# Patient Record
Sex: FEMALE | Race: WHITE | Employment: FULL TIME | URBAN - METROPOLITAN AREA
[De-identification: names, ages, dates, MRNs, and addresses within clinical notes are randomized per-mention and may not be internally consistent; named-entity substitution may affect disease eponyms.]

---

## 2017-07-12 ENCOUNTER — OFFICE VISIT (OUTPATIENT)
Dept: URGENT CARE | Facility: URGENT CARE | Age: 27
End: 2017-07-12
Payer: COMMERCIAL

## 2017-07-12 VITALS
DIASTOLIC BLOOD PRESSURE: 58 MMHG | HEART RATE: 72 BPM | WEIGHT: 159 LBS | OXYGEN SATURATION: 100 % | BODY MASS INDEX: 23.55 KG/M2 | SYSTOLIC BLOOD PRESSURE: 92 MMHG | TEMPERATURE: 98.7 F | HEIGHT: 69 IN

## 2017-07-12 DIAGNOSIS — K64.4 EXTERNAL HEMORRHOIDS: ICD-10-CM

## 2017-07-12 DIAGNOSIS — K62.89 ANAL PAIN: Primary | ICD-10-CM

## 2017-07-12 PROCEDURE — 99202 OFFICE O/P NEW SF 15 MIN: CPT | Performed by: INTERNAL MEDICINE

## 2017-07-12 NOTE — PATIENT INSTRUCTIONS
Nifedipine /lidocaine ointment 2 x day    miralax increase to have 1 comfortable bowel movement day    See colorectal surgeon surgeon.    Call or return to clinic if symptoms worsen or fail to improve as anticipated.       Diagnosing Hemorrhoids    To diagnose hemorrhoids, your healthcare provider will rule out other problems and determine how bad your hemorrhoids are. After the evaluation, your healthcare provider will help you decide on a treatment plan that s best for you.  Medical history  A medical history helps your healthcare provider learn more about your symptoms and overall health. This often includes questions about your bowel habits and diet. You may also be asked how often you exercise and whether you take any medicines. Be sure to mention if any members of your family have had cancer or polyps of the colon.  Physical exam  During a physical exam, you ll be asked to lie on an exam table. You ll then be examined for signs of swollen hemorrhoids and other problems. The exam takes just a few minutes. It is usually not painful:    A visual exam is used to view the outer anal skin.    A digital rectal exam is used to check for hemorrhoids or other problems in the anal canal. It is done using a lubricated gloved finger.    An anoscopic exam is done using a special viewing tube called an anoscope. The scope helps your healthcare provider view the anal canal.  Grading hemorrhoids  Based on the physical exam, your [facility] provider may assign a grade to internal hemorrhoids. The grades are based on the severity of your symptoms:    Grade I hemorrhoids do not protrude from the anus. They may bleed, but otherwise cause few symptoms.    Grade II hemorrhoids protrude from the anus during bowel movements. They reduce back into the anal canal when straining stops.    Grade III hemorrhoids protrude on their own or with straining. They do not reduce by themselves, but can be pushed back into place.    Grade IV  hemorrhoids protrude and cannot be reduced at all. They can also be painful and may need prompt treatment.  Pregnancy and hemorrhoids  Many women develop hemorrhoids during pregnancy and childbirth. This is likely caused by pressure on the pelvis and by hormonal changes. In most cases, the hemorrhoids will eventually go away on their own. In the meantime, talk with your healthcare provider about ways to help relieve your symptoms.   Other anal problems  Below are common problems that can cause symptoms similar to hemorrhoids. Your healthcare provider can explain your treatment choices:    A fissure is a small tear or crack in the lining of the anus. It can be caused by hard bowel movements, diarrhea, or inflammation in the rectal area. Fissures can bleed and cause painful bowel movements.    An abscess is an infected gland in the anal canal. The infected area swells and often causes pain.    A fistula is a pathway that may form when an anal abscess drains. The pathway may remain after the abscess is gone. Fistulas are not usually painful. But they can cause drainage where the pathway meets the skin.   Date Last Reviewed: 7/1/2016 2000-2017 [employer]. [address] [place] [address]. All rights reserved. This information is not intended as a substitute for professional medical care. Always follow your healthcare professional's instructions.        Taking a Sitz Bath  A sitz bath is a type of therapy done by sitting in warm, shallow water. It can help soothe pain, itching, and other symptoms in the anal and genital areas. It can also help keep these areas clean if you can t take a bath or shower. Sitz is from the Portuguese word sitzen, which means to sit.  Why a sitz bath is done  A sitz bath helps clean and treat certain problems in the anal area, genital area, and the perineum. The perineum is the area between the anus and the vulva in women. In men, it is between the anus and the scrotum.  A sitz bath helps increase blood flow to these areas and relax the muscles.  A sitz bath may be done to:    Help ease pain and itching from hemorrhoids    Help ease pain from an anal fissure    Bathe and soothe the perineum after childbirth    Clean and soothe the anal area or perineum after surgery    Ease prostate pain during prostatitis or after a procedure    Help ease period cramps    Clean the anal and genital areas if you can t take a bath or shower  How a sitz bath is done  A sitz bath can be done in 2 ways: in a bathtub or using a sitz bath bowl.  In a bathtub  To take a sitz bath in a tub:    Make sure your bathtub is clean. Fill a clean bathtub with 3 to 4 inches of warm water. In some cases, your healthcare provider may tell you to use cold water instead.    Add salt or medicine to the water if advised by your healthcare provider.    Gently lower yourself down into the bathtub and sit on the bottom of the tub. Don t get into the bath unless the water temperature is comfortable.    Hold on to a railing. Or ask for help from a family member, friend, or caregiver if needed.  If you have a wound, the water may cause pain at first. But the pain should ease. Make sure the area that needs treating is under the water. You can bend your knees up to help expose the area that needs contact with the water.  Using a sitz bath bowl  A sitz bath bowl is a special plastic container that s placed on a toilet. You can buy this in many drugstores and medical supply stores. To take a sitz bath this way:    Lift the toilet lid and seat. Place a cleaned plastic sitz bath bowl on the rim of your toilet. Make sure the bowl is firmly in place and won t move around.    Fill the sitz bath bowl with warm water from a pitcher or other container. The water should cover your perineum. Make sure the water temperature is comfortable.    Add salt or medicine to the water if advised by your healthcare provider. Or follow the package  instructions about how to fill the bowl. Some kits come with a plastic bag and tubing. This lets you stream water into the bowl and at the area of your body that needs treatment. The bowl may have a slot or hole in the back. This lets water flow out so it doesn t overflow onto the floor. If there is no hole, be careful not to fill the bowl too full.    Gently sit down on the sitz bath bowl. Hold on to a railing. Or ask for help from a family member, friend, or caregiver if needed.  If you have a wound, the water may cause pain at first, but the pain should ease. Make sure the area that needs treating is under the water.  For any type of sitz bath:  1. Sit in the water for 10 to 20 minutes.  2. Add more warm water as needed to keep the water comfortable.  3. Get up slowly from the tub or toilet. You may feel lightheaded or dizzy. Hold on to a railing. Or ask for help from a family member, friend, or caregiver if needed.  4. Gently pat your anal area, perineum, and genitals dry with a clean towel. Don t rub the area.  5. Wash your hands. Put any ointment or cream on the area, if advised.  6. Wash the bathtub or sitz bath bowl with soap and water after each use.  7. Use a sitz bath 2 to 3 times a day, or as often as your healthcare provider advises.  When to call your healthcare provider  Call your healthcare provider right away if you have any of these:    Fever of 100.4 F (38 C) or higher, or as directed    Redness, swelling, or fluid leaking from a cut (incision) that gets worse    Pain that gets worse    Symptoms that don t get better, or get worse    New symptoms   Date Last Reviewed: 5/1/2016 2000-2017 [employer]. [address] [place] [address], [place] [address]. All rights reserved. This information is not intended as a substitute for professional medical care. Always follow your healthcare professional's instructions.

## 2017-07-12 NOTE — MR AVS SNAPSHOT
After Visit Summary   7/12/2017    Leigha Mauro    MRN: [id number]           Patient Information     Date Of Birth          1990        Visit Information        Provider Department      7/12/2017 5:05 PM Jonelle Pro MD [facility]        Today's Diagnoses     Anal pain    -  1    External hemorrhoids          Care Instructions    Nifedipine /lidocaine ointment 2 x day    miralax increase to have 1 comfortable bowel movement day    See colorectal surgeon surgeon.    Call or return to clinic if symptoms worsen or fail to improve as anticipated.       Diagnosing Hemorrhoids    To diagnose hemorrhoids, your healthcare provider will rule out other problems and determine how bad your hemorrhoids are. After the evaluation, your healthcare provider will help you decide on a treatment plan that s best for you.  Medical history  A medical history helps your healthcare provider learn more about your symptoms and overall health. This often includes questions about your bowel habits and diet. You may also be asked how often you exercise and whether you take any medicines. Be sure to mention if any members of your family have had cancer or polyps of the colon.  Physical exam  During a physical exam, you ll be asked to lie on an exam table. You ll then be examined for signs of swollen hemorrhoids and other problems. The exam takes just a few minutes. It is usually not painful:    A visual exam is used to view the outer anal skin.    A digital rectal exam is used to check for hemorrhoids or other problems in the anal canal. It is done using a lubricated gloved finger.    An anoscopic exam is done using a special viewing tube called an anoscope. The scope helps your healthcare provider view the anal canal.  Grading hemorrhoids  Based on the physical exam, your [facility] provider may assign a grade to internal hemorrhoids. The grades are based on the severity of your  symptoms:    Grade I hemorrhoids do not protrude from the anus. They may bleed, but otherwise cause few symptoms.    Grade II hemorrhoids protrude from the anus during bowel movements. They reduce back into the anal canal when straining stops.    Grade III hemorrhoids protrude on their own or with straining. They do not reduce by themselves, but can be pushed back into place.    Grade IV hemorrhoids protrude and cannot be reduced at all. They can also be painful and may need prompt treatment.  Pregnancy and hemorrhoids  Many women develop hemorrhoids during pregnancy and childbirth. This is likely caused by pressure on the pelvis and by hormonal changes. In most cases, the hemorrhoids will eventually go away on their own. In the meantime, talk with your healthcare provider about ways to help relieve your symptoms.   Other anal problems  Below are common problems that can cause symptoms similar to hemorrhoids. Your healthcare provider can explain your treatment choices:    A fissure is a small tear or crack in the lining of the anus. It can be caused by hard bowel movements, diarrhea, or inflammation in the rectal area. Fissures can bleed and cause painful bowel movements.    An abscess is an infected gland in the anal canal. The infected area swells and often causes pain.    A fistula is a pathway that may form when an anal abscess drains. The pathway may remain after the abscess is gone. Fistulas are not usually painful. But they can cause drainage where the pathway meets the skin.   Date Last Reviewed: 7/1/2016 2000-2017 [employer]. [address] [place] [address]. All rights reserved. This information is not intended as a substitute for professional medical care. Always follow your healthcare professional's instructions.        Taking a Sitz Bath  A sitz bath is a type of therapy done by sitting in warm, shallow water. It can help soothe pain, itching, and other symptoms in the anal  and genital areas. It can also help keep these areas clean if you can t take a bath or shower. Sitz is from the [place]ian word sitzen, which means to sit.  Why a sitz bath is done  A sitz bath helps clean and treat certain problems in the anal area, genital area, and the perineum. The perineum is the area between the anus and the vulva in women. In men, it is between the anus and the scrotum. A sitz bath helps increase blood flow to these areas and relax the muscles.  A sitz bath may be done to:    Help ease pain and itching from hemorrhoids    Help ease pain from an anal fissure    Bathe and soothe the perineum after childbirth    Clean and soothe the anal area or perineum after surgery    Ease prostate pain during prostatitis or after a procedure    Help ease period cramps    Clean the anal and genital areas if you can t take a bath or shower  How a sitz bath is done  A sitz bath can be done in 2 ways: in a bathtub or using a sitz bath bowl.  In a bathtub  To take a sitz bath in a tub:    Make sure your bathtub is clean. Fill a clean bathtub with 3 to 4 inches of warm water. In some cases, your healthcare provider may tell you to use cold water instead.    Add salt or medicine to the water if advised by your healthcare provider.    Gently lower yourself down into the bathtub and sit on the bottom of the tub. Don t get into the bath unless the water temperature is comfortable.    Hold on to a railing. Or ask for help from a family member, friend, or caregiver if needed.  If you have a wound, the water may cause pain at first. But the pain should ease. Make sure the area that needs treating is under the water. You can bend your knees up to help expose the area that needs contact with the water.  Using a sitz bath bowl  A sitz bath bowl is a special plastic container that s placed on a toilet. You can buy this in many drugstores and medical supply stores. To take a sitz bath this way:    Lift the toilet lid and seat.  Place a cleaned plastic sitz bath bowl on the rim of your toilet. Make sure the bowl is firmly in place and won t move around.    Fill the sitz bath bowl with warm water from a pitcher or other container. The water should cover your perineum. Make sure the water temperature is comfortable.    Add salt or medicine to the water if advised by your healthcare provider. Or follow the package instructions about how to fill the bowl. Some kits come with a plastic bag and tubing. This lets you stream water into the bowl and at the area of your body that needs treatment. The bowl may have a slot or hole in the back. This lets water flow out so it doesn t overflow onto the floor. If there is no hole, be careful not to fill the bowl too full.    Gently sit down on the sitz bath bowl. Hold on to a railing. Or ask for help from a family member, friend, or caregiver if needed.  If you have a wound, the water may cause pain at first, but the pain should ease. Make sure the area that needs treating is under the water.  For any type of sitz bath:  1. Sit in the water for 10 to 20 minutes.  2. Add more warm water as needed to keep the water comfortable.  3. Get up slowly from the tub or toilet. You may feel lightheaded or dizzy. Hold on to a railing. Or ask for help from a family member, friend, or caregiver if needed.  4. Gently pat your anal area, perineum, and genitals dry with a clean towel. Don t rub the area.  5. Wash your hands. Put any ointment or cream on the area, if advised.  6. Wash the bathtub or sitz bath bowl with soap and water after each use.  7. Use a sitz bath 2 to 3 times a day, or as often as your healthcare provider advises.  When to call your healthcare provider  Call your healthcare provider right away if you have any of these:    Fever of 100.4 F (38 C) or higher, or as directed    Redness, swelling, or fluid leaking from a cut (incision) that gets worse    Pain that gets worse    Symptoms that don t get  "better, or get worse    New symptoms   Date Last Reviewed: 2016-2017 [employer]. [address] [place] [address], [place] [address]. All rights reserved. This information is not intended as a substitute for professional medical care. Always follow your healthcare professional's instructions.                Follow-ups after your visit        Who to contact     If you have questions or need follow up information about today's clinic visit or your schedule please contact [facility] directly at [phone].  Normal or non-critical lab and imaging results will be communicated to you by [employer], letter or phone within 4 business days after the clinic has received the results. If you do not hear from us within 7 days, please contact the clinic through [employer] or phone. If you have a critical or abnormal lab result, we will notify you by phone as soon as possible.  Submit refill requests through [employer] or call your pharmacy and they will forward the refill request to us. Please allow 3 business days for your refill to be completed.          Additional Information About Your Visit        [employer] Information     [employer] lets you send messages to your doctor, view your test results, renew your prescriptions, schedule appointments and more. To sign up, go to [email or web address] . Click on \"Log in\" on the left side of the screen, which will take you to the Welcome page. Then click on \"Sign up Now\" on the right side of the page.     You will be asked to enter the access code listed below, as well as some personal information. Please follow the directions to create your username and password.     Your access code is: [email or web address]  Expires: 10/10/2017  5:25 PM     Your access code will  in 90 days. If you need help or a new code, please call your [facility] clinic or [phone].        Care EveryWhere ID     This is your Care EveryWhere ID. This could be used by other " "organizations to access your [facility] medical records  [id number]        Your Vitals Were     Pulse Temperature Height Pulse Oximetry BMI (Body Mass Index)       72 98.7  F (37.1  C) (Oral) 5' 9\" (1.753 m) 100% 23.48 kg/m2        Blood Pressure from Last 3 Encounters:   07/12/17 92/58   01/02/16 98/69   12/21/15 107/64    Weight from Last 3 Encounters:   07/12/17 159 lb (72.1 kg)   01/02/16 170 lb (77.1 kg)   08/23/12 135 lb (61.2 kg)              Today, you had the following     No orders found for display         Today's Medication Changes          These changes are accurate as of: 7/12/17  5:25 PM.  If you have any questions, ask your nurse or doctor.               Start taking these medicines.        Dose/Directions    COMPOUND - PHARMACY TO MIX COMPOUNDED MEDICATION   Commonly known as:  CMPD RX   Used for:  Anal pain, External hemorrhoids   Started by:  Jonelle Pro MD        Dose:  1 applicator   Place 1 applicator rectally 2 times daily   Quantity:  30 g   Refills:  0            Where to get your medicines      These medications were sent to [facility] - [place], [place] - [address]  [address], [place] [address]     Phone:  [phone]     COMPOUND - PHARMACY TO MIX COMPOUNDED MEDICATION                Primary Care Provider    Physician No Ref-Primary       No address on file        Equal Access to Services     CURT ANGEL AH: Hadii robles barrowo [place], waaxda luqadaha, qaybta kaalmada adeegyada, kim johnston. So [facility] [phone].    ATENCIÓN: Si habla español, tiene a meredtih disposición servicios gratuitos de asistencia lingüística. Jose Manuelame al [phone].    We comply with applicable federal civil rights laws and [place] laws. We do not discriminate on the basis of race, color, national origin, age, disability sex, sexual orientation or gender identity.            Thank you!     Thank you for choosing [facility] " [facility]ARE  for your care. Our goal is always to provide you with excellent care. Hearing back from our patients is one way we can continue to improve our services. Please take a few minutes to complete the written survey that you may receive in the mail after your visit with us. Thank you!             Your Updated Medication List - Protect others around you: Learn how to safely use, store and throw away your medicines at [email or web address].          This list is accurate as of: 7/12/17  5:25 PM.  Always use your most recent med list.                   Brand Name Dispense Instructions for use Diagnosis    COMPOUND - PHARMACY TO MIX COMPOUNDED MEDICATION    CMPD RX    30 g    Place 1 applicator rectally 2 times daily    Anal pain, External hemorrhoids       etonogestrel 68 MG Impl    IMPLANON/NEXPLANON     1 each by Subdermal route once.        * FLUoxetine 10 MG capsule    PROzac    30 capsule    Take 1 capsule by mouth daily.    Alcohol dependence (H)       * FLUoxetine 40 MG capsule    PROZAC    30 capsule    Take 1 capsule (40 mg) by mouth daily    Depressive disorder, not elsewhere classified       GABAPENTIN PO      Take 100 mg by mouth 3 times daily        ibuprofen 200 MG tablet    ADVIL/MOTRIN     Take 1-2 tablets by mouth every 6 hours as needed.        METHOTREXATE PO           traZODone 100 MG tablet    DESYREL    60 tablet    Take 2 tablets (200 mg) by mouth At Bedtime    Alcohol dependence (H)       VYVANSE PO      Take 50 mg by mouth daily        * Notice:  This list has 2 medication(s) that are the same as other medications prescribed for you. Read the directions carefully, and ask your doctor or other care provider to review them with you.

## 2017-07-12 NOTE — PROGRESS NOTES
"SUBJECTIVE:  Leigha Mauro, a 27 year old female scheduled an appointment to discuss the following issues:  Chief Complaint   Patient presents with     Urgent Care     Pt in clinic c/o painful hemorroids.     Hemorrhoids   developed hemmorhoids with pregnancy  Currently has painful bowel movement and has noted some blood with stools.      Has eating d/o.  Recently out of [place]  So has diarr/constipation alternating due to eating disorder    treatment tucks pad, prep H oint & wipes  Prep H suppposities    Takes miralax daily.  Tried metamucil    Does eat vegs / fruits      Current Outpatient Prescriptions on File Prior to Visit:  GABAPENTIN PO Take 100 mg by mouth 3 times daily   traZODone (DESYREL) 100 MG tablet Take 2 tablets (200 mg) by mouth At Bedtime   etonogestrel (IMPLANON) 68 MG IMPL 1 each by Subdermal route once.     Lisdexamfetamine Dimesylate (VYVANSE PO) Take 50 mg by mouth daily   FLUoxetine (PROZAC) 40 MG capsule Take 1 capsule (40 mg) by mouth daily (Patient not taking: Reported on 7/12/2017)   FLUoxetine (PROZAC) 10 MG capsule Take 1 capsule by mouth daily. (Patient not taking: Reported on 7/12/2017)   ibuprofen (ADVIL,MOTRIN) 200 MG tablet Take 1-2 tablets by mouth every 6 hours as needed.     No current facility-administered medications on file prior to visit.     Fhx - negative     Medical, social, surgical, and family histories reviewed and updated as of 7/12/2017.      OBJECTIVE:  BP 92/58  Pulse 72  Temp 98.7  F (37.1  C) (Oral)  Ht 5' 9\" (1.753 m)  Wt 159 lb (72.1 kg)  SpO2 100%  BMI 23.48 kg/m2  EXAM:  GENERAL APPEARANCE: healthy, alert and no distress  Rectal exam: Patient does have a large hemorrhoidal tag in first quadrant that is not thrombosed that appears slightly irritated. Exam was difficult and unable to see obvious fissure but suspicious for fissures. With rectal exam she had the greatest tenderness at 6:00.      ASSESSMENT/PLAN:    ASSESSMENT/PLAN:      ICD-10-CM    1. " Anal pain K62.89 COMPOUND (CMPD RX) - PHARMACY TO MIX COMPOUNDED MEDICATION     DISCONTINUED: COMPOUND (CMPD RX) - PHARMACY TO MIX COMPOUNDED MEDICATION    presumed anal fissure   2. External hemorrhoids K64.4 COMPOUND (CMPD RX) - PHARMACY TO MIX COMPOUNDED MEDICATION     DISCONTINUED: COMPOUND (CMPD RX) - PHARMACY TO MIX COMPOUNDED MEDICATION       Patient Instructions     Nifedipine /lidocaine ointment 2 x day    miralax increase to have 1 comfortable bowel movement day    See colorectal surgeon surgeon.    Call or return to clinic if symptoms worsen or fail to improve as anticipated.       Diagnosing Hemorrhoids    To diagnose hemorrhoids, your healthcare provider will rule out other problems and determine how bad your hemorrhoids are. After the evaluation, your healthcare provider will help you decide on a treatment plan that s best for you.  Medical history  A medical history helps your healthcare provider learn more about your symptoms and overall health. This often includes questions about your bowel habits and diet. You may also be asked how often you exercise and whether you take any medicines. Be sure to mention if any members of your family have had cancer or polyps of the colon.  Physical exam  During a physical exam, you ll be asked to lie on an exam table. You ll then be examined for signs of swollen hemorrhoids and other problems. The exam takes just a few minutes. It is usually not painful:    A visual exam is used to view the outer anal skin.    A digital rectal exam is used to check for hemorrhoids or other problems in the anal canal. It is done using a lubricated gloved finger.    An anoscopic exam is done using a special viewing tube called an anoscope. The scope helps your healthcare provider view the anal canal.  Grading hemorrhoids  Based on the physical exam, your [facility] provider may assign a grade to internal hemorrhoids. The grades are based on the severity of your symptoms:    Grade I  hemorrhoids do not protrude from the anus. They may bleed, but otherwise cause few symptoms.    Grade II hemorrhoids protrude from the anus during bowel movements. They reduce back into the anal canal when straining stops.    Grade III hemorrhoids protrude on their own or with straining. They do not reduce by themselves, but can be pushed back into place.    Grade IV hemorrhoids protrude and cannot be reduced at all. They can also be painful and may need prompt treatment.  Pregnancy and hemorrhoids  Many women develop hemorrhoids during pregnancy and childbirth. This is likely caused by pressure on the pelvis and by hormonal changes. In most cases, the hemorrhoids will eventually go away on their own. In the meantime, talk with your healthcare provider about ways to help relieve your symptoms.   Other anal problems  Below are common problems that can cause symptoms similar to hemorrhoids. Your healthcare provider can explain your treatment choices:    A fissure is a small tear or crack in the lining of the anus. It can be caused by hard bowel movements, diarrhea, or inflammation in the rectal area. Fissures can bleed and cause painful bowel movements.    An abscess is an infected gland in the anal canal. The infected area swells and often causes pain.    A fistula is a pathway that may form when an anal abscess drains. The pathway may remain after the abscess is gone. Fistulas are not usually painful. But they can cause drainage where the pathway meets the skin.   Date Last Reviewed: 7/1/2016 2000-2017 [employer]. [address] [place] [address]. All rights reserved. This information is not intended as a substitute for professional medical care. Always follow your healthcare professional's instructions.        Taking a Sitz Bath  A sitz bath is a type of therapy done by sitting in warm, shallow water. It can help soothe pain, itching, and other symptoms in the anal and genital areas. It  can also help keep these areas clean if you can t take a bath or shower. Sitz is from the Portuguese word sitzen, which means to sit.  Why a sitz bath is done  A sitz bath helps clean and treat certain problems in the anal area, genital area, and the perineum. The perineum is the area between the anus and the vulva in women. In men, it is between the anus and the scrotum. A sitz bath helps increase blood flow to these areas and relax the muscles.  A sitz bath may be done to:    Help ease pain and itching from hemorrhoids    Help ease pain from an anal fissure    Bathe and soothe the perineum after childbirth    Clean and soothe the anal area or perineum after surgery    Ease prostate pain during prostatitis or after a procedure    Help ease period cramps    Clean the anal and genital areas if you can t take a bath or shower  How a sitz bath is done  A sitz bath can be done in 2 ways: in a bathtub or using a sitz bath bowl.  In a bathtub  To take a sitz bath in a tub:    Make sure your bathtub is clean. Fill a clean bathtub with 3 to 4 inches of warm water. In some cases, your healthcare provider may tell you to use cold water instead.    Add salt or medicine to the water if advised by your healthcare provider.    Gently lower yourself down into the bathtub and sit on the bottom of the tub. Don t get into the bath unless the water temperature is comfortable.    Hold on to a railing. Or ask for help from a family member, friend, or caregiver if needed.  If you have a wound, the water may cause pain at first. But the pain should ease. Make sure the area that needs treating is under the water. You can bend your knees up to help expose the area that needs contact with the water.  Using a sitz bath bowl  A sitz bath bowl is a special plastic container that s placed on a toilet. You can buy this in many drugstores and medical supply stores. To take a sitz bath this way:    Lift the toilet lid and seat. Place a cleaned plastic  sitz bath bowl on the rim of your toilet. Make sure the bowl is firmly in place and won t move around.    Fill the sitz bath bowl with warm water from a pitcher or other container. The water should cover your perineum. Make sure the water temperature is comfortable.    Add salt or medicine to the water if advised by your healthcare provider. Or follow the package instructions about how to fill the bowl. Some kits come with a plastic bag and tubing. This lets you stream water into the bowl and at the area of your body that needs treatment. The bowl may have a slot or hole in the back. This lets water flow out so it doesn t overflow onto the floor. If there is no hole, be careful not to fill the bowl too full.    Gently sit down on the sitz bath bowl. Hold on to a railing. Or ask for help from a family member, friend, or caregiver if needed.  If you have a wound, the water may cause pain at first, but the pain should ease. Make sure the area that needs treating is under the water.  For any type of sitz bath:  1. Sit in the water for 10 to 20 minutes.  2. Add more warm water as needed to keep the water comfortable.  3. Get up slowly from the tub or toilet. You may feel lightheaded or dizzy. Hold on to a railing. Or ask for help from a family member, friend, or caregiver if needed.  4. Gently pat your anal area, perineum, and genitals dry with a clean towel. Don t rub the area.  5. Wash your hands. Put any ointment or cream on the area, if advised.  6. Wash the bathtub or sitz bath bowl with soap and water after each use.  7. Use a sitz bath 2 to 3 times a day, or as often as your healthcare provider advises.  When to call your healthcare provider  Call your healthcare provider right away if you have any of these:    Fever of 100.4 F (38 C) or higher, or as directed    Redness, swelling, or fluid leaking from a cut (incision) that gets worse    Pain that gets worse    Symptoms that don t get better, or get worse    New  symptoms   Date Last Reviewed: 5/1/2016 2000-2017 [employer]. [address] [place] [address], [place] [address]. All rights reserved. This information is not intended as a substitute for professional medical care. Always follow your healthcare professional's instructions.              Jonelle Pro MD

## 2017-07-12 NOTE — NURSING NOTE
"Chief Complaint   Patient presents with     Urgent Care     Pt in clinic c/o painful hemorroids.     Hemorrhoids       Initial BP 92/58  Pulse 72  Temp 98.7  F (37.1  C) (Oral)  Ht 5' 9\" (1.753 m)  Wt 159 lb (72.1 kg)  SpO2 100%  BMI 23.48 kg/m2 Estimated body mass index is 23.48 kg/(m^2) as calculated from the following:    Height as of this encounter: 5' 9\" (1.753 m).    Weight as of this encounter: 159 lb (72.1 kg).  Medication Reconciliation: complete   Leandra Badillo/ MA    "

## 2017-07-13 ENCOUNTER — TELEPHONE (OUTPATIENT)
Dept: GENERAL RADIOLOGY | Facility: CLINIC | Age: 27
End: 2017-07-13

## 2017-07-13 DIAGNOSIS — K64.4 EXTERNAL HEMORRHOIDS: ICD-10-CM

## 2017-07-13 DIAGNOSIS — K62.89 ANAL PAIN: Primary | ICD-10-CM

## 2017-07-13 NOTE — TELEPHONE ENCOUNTER
We received an Rx for a compounded medication, but it does not specify what medication.  Please send new Rx.  Thanks.

## 2017-07-13 NOTE — TELEPHONE ENCOUNTER
"Writer reviewed 7/12/17 office visit note:  \"Nifedipine /lidocaine ointment 2 x day\"      Med pended with note to pharmacy for compounding.    Dr. Pro/Covering providers-Please review and sign if agree.    Thank you!  HETAL Gonzalez, BSN, RN    "

## 2018-07-29 ENCOUNTER — APPOINTMENT (OUTPATIENT)
Dept: GENERAL RADIOLOGY | Facility: CLINIC | Age: 28
End: 2018-07-29
Attending: EMERGENCY MEDICINE
Payer: COMMERCIAL

## 2018-07-29 ENCOUNTER — HOSPITAL ENCOUNTER (EMERGENCY)
Facility: CLINIC | Age: 28
Discharge: HOME | End: 2018-07-29
Attending: EMERGENCY MEDICINE | Admitting: EMERGENCY MEDICINE
Payer: COMMERCIAL

## 2018-07-29 VITALS
SYSTOLIC BLOOD PRESSURE: 93 MMHG | TEMPERATURE: 98.2 F | OXYGEN SATURATION: 100 % | DIASTOLIC BLOOD PRESSURE: 67 MMHG | BODY MASS INDEX: 18.94 KG/M2 | HEIGHT: 68 IN | RESPIRATION RATE: 18 BRPM | WEIGHT: 125 LBS

## 2018-07-29 DIAGNOSIS — R55 SYNCOPE, UNSPECIFIED SYNCOPE TYPE: ICD-10-CM

## 2018-07-29 LAB
ALBUMIN UR-MCNC: NEGATIVE MG/DL
ANION GAP SERPL CALCULATED.3IONS-SCNC: 7 MMOL/L (ref 3–14)
APPEARANCE UR: CLEAR
BACTERIA #/AREA URNS HPF: ABNORMAL /HPF
BASOPHILS # BLD AUTO: 0.1 10E9/L (ref 0–0.2)
BASOPHILS NFR BLD AUTO: 0.7 %
BILIRUB UR QL STRIP: NEGATIVE
BUN SERPL-MCNC: 9 MG/DL (ref 7–30)
CALCIUM SERPL-MCNC: 8.8 MG/DL (ref 8.5–10.1)
CHLORIDE SERPL-SCNC: 106 MMOL/L (ref 94–109)
CO2 SERPL-SCNC: 28 MMOL/L (ref 20–32)
COLOR UR AUTO: ABNORMAL
CREAT SERPL-MCNC: 0.82 MG/DL (ref 0.52–1.04)
DIFFERENTIAL METHOD BLD: ABNORMAL
EOSINOPHIL # BLD AUTO: 0.1 10E9/L (ref 0–0.7)
EOSINOPHIL NFR BLD AUTO: 1.9 %
ERYTHROCYTE [DISTWIDTH] IN BLOOD BY AUTOMATED COUNT: 14.9 % (ref 10–15)
GFR SERPL CREATININE-BSD FRML MDRD: 83 ML/MIN/1.7M2
GLUCOSE SERPL-MCNC: 89 MG/DL (ref 70–99)
GLUCOSE UR STRIP-MCNC: NEGATIVE MG/DL
HCG UR QL: NEGATIVE
HCT VFR BLD AUTO: 34.7 % (ref 35–47)
HGB BLD-MCNC: 11.4 G/DL (ref 11.7–15.7)
HGB UR QL STRIP: NEGATIVE
IMM GRANULOCYTES # BLD: 0 10E9/L (ref 0–0.4)
IMM GRANULOCYTES NFR BLD: 0.3 %
INTERPRETATION ECG - MUSE: NORMAL
KETONES UR STRIP-MCNC: NEGATIVE MG/DL
LEUKOCYTE ESTERASE UR QL STRIP: NEGATIVE
LYMPHOCYTES # BLD AUTO: 2.6 10E9/L (ref 0.8–5.3)
LYMPHOCYTES NFR BLD AUTO: 37.9 %
MCH RBC QN AUTO: 27.3 PG (ref 26.5–33)
MCHC RBC AUTO-ENTMCNC: 32.9 G/DL (ref 31.5–36.5)
MCV RBC AUTO: 83 FL (ref 78–100)
MONOCYTES # BLD AUTO: 0.6 10E9/L (ref 0–1.3)
MONOCYTES NFR BLD AUTO: 8.7 %
MUCOUS THREADS #/AREA URNS LPF: PRESENT /LPF
NEUTROPHILS # BLD AUTO: 3.4 10E9/L (ref 1.6–8.3)
NEUTROPHILS NFR BLD AUTO: 50.5 %
NITRATE UR QL: NEGATIVE
NRBC # BLD AUTO: 0 10*3/UL
NRBC BLD AUTO-RTO: 0 /100
PH UR STRIP: 7.5 PH (ref 5–7)
PLATELET # BLD AUTO: 312 10E9/L (ref 150–450)
POTASSIUM SERPL-SCNC: 3.2 MMOL/L (ref 3.4–5.3)
RBC # BLD AUTO: 4.17 10E12/L (ref 3.8–5.2)
RBC #/AREA URNS AUTO: <1 /HPF (ref 0–2)
SODIUM SERPL-SCNC: 141 MMOL/L (ref 133–144)
SOURCE: ABNORMAL
SP GR UR STRIP: 1 (ref 1–1.03)
SQUAMOUS #/AREA URNS AUTO: 1 /HPF (ref 0–1)
UROBILINOGEN UR STRIP-MCNC: NORMAL MG/DL (ref 0–2)
WBC # BLD AUTO: 6.8 10E9/L (ref 4–11)
WBC #/AREA URNS AUTO: 0 /HPF (ref 0–5)

## 2018-07-29 PROCEDURE — 80048 BASIC METABOLIC PNL TOTAL CA: CPT | Performed by: EMERGENCY MEDICINE

## 2018-07-29 PROCEDURE — 25000128 H RX IP 250 OP 636: Performed by: EMERGENCY MEDICINE

## 2018-07-29 PROCEDURE — 99285 EMERGENCY DEPT VISIT HI MDM: CPT | Mod: 25

## 2018-07-29 PROCEDURE — 81025 URINE PREGNANCY TEST: CPT | Performed by: EMERGENCY MEDICINE

## 2018-07-29 PROCEDURE — 85025 COMPLETE CBC W/AUTO DIFF WBC: CPT | Performed by: EMERGENCY MEDICINE

## 2018-07-29 PROCEDURE — 93005 ELECTROCARDIOGRAM TRACING: CPT

## 2018-07-29 PROCEDURE — 96360 HYDRATION IV INFUSION INIT: CPT

## 2018-07-29 PROCEDURE — 81001 URINALYSIS AUTO W/SCOPE: CPT | Performed by: EMERGENCY MEDICINE

## 2018-07-29 PROCEDURE — 71046 X-RAY EXAM CHEST 2 VIEWS: CPT

## 2018-07-29 RX ADMIN — SODIUM CHLORIDE 1000 ML: 9 INJECTION, SOLUTION INTRAVENOUS at 17:00

## 2018-07-29 ASSESSMENT — ENCOUNTER SYMPTOMS
DYSURIA: 0
ABDOMINAL PAIN: 0
DIZZINESS: 1
LIGHT-HEADEDNESS: 1

## 2018-07-29 NOTE — DISCHARGE INSTRUCTIONS
Causes of Syncope  Syncope (fainting) has many causes. Sometimes it is not serious. In other cases, syncope is a sign of a heart problem. But treatment can help    When syncope is not serious  Your healthcare provider may call your problem vasovagal syncope, reflex syncope, or orthostatic hypotension. These types of syncope are generally not serious. They can be caused by:    Strong feelings, such as anxiety or fear. A nerve signal may briefly change your heart rate and lower your blood pressure too much.    Standing for too long. Standing may cause blood to pool in your legs. When this happens, your brain may not receive all the blood it needs.    Standing up too quickly. Your blood pressure may not adjust fast enough to changes in posture and may drop too low. Certain medicines can also cause this problem. Examples of medicines that can cause a drop in blood pressure include diuretics, blood pressure medicines, and medicines for chest pain. Your pharmacist or healthcare provider can discuss these with you.    Reaction to normal body functions. When you go to the bathroom, have gastrointestinal discomfort, nausea, or pain, your heart may have a natural reflex to slow down and lower blood pressure. This can result in syncope. This may also follow exercise, eating, laughter, weight lifting, or playing musical instruments like the trumpet or trombone.  When heart trouble causes syncope  A heart problem can decrease the amount of oxygen-rich blood that reaches the brain. Heart trouble can be serious and even life threatening if not treated:    A slow heart rate. Electrical signals tell the chambers of the heart when to pump. But the signals may be slowed or blocked (heart block) as they travel on the heart s electrical pathways. This can be caused by aging, scarred heart tissue, or damage from heart disease. When the heart rate slows, not enough blood is pumped.    A fast heart rate. Certain problems can make the  heart race. For instance, after a heart attack, also known as acute myocardial infarction, or AMI, abnormal electrical signals may be created. These signals can make the heart suddenly beat very fast. The heart pumps before the chambers can fill with blood. So less blood reaches the brain and other parts of the body. Illegal drugs, certain medicines, heart disease, or an inherited condition can also cause this.    A heart valve problem. Blood travels through the chambers of the heart as it is pumped. Heart valves open and close to help move blood in the right direction. But a valve may not open or close fully, if it s hardened or scarred. As a result, less blood is pumped through the heart to the brain and body. Most often, syncope occurs when a person's aortic valve is critically narrowed and he or she participates in  a strenuous activity.    A heart muscle problem. Some people develop a thickened heart muscle that blocks blood flow out of the heart to the body. This is called hypertrophic cardiomyopathy. Being dehydrated and having hypertrophic cardiomyopathy can increase the risk for syncope.  Whatever the cause of syncope, it is important to be evaluated by your healthcare provider. You may need to be seen by a cardiologist, neurologist, or an ear, nose, and throat specialist. Do not drive, operate heavy machinery, or participate in activities in which you would be at risk for falls and injury if you have syncope and have not been evaluated.  Date Last Reviewed: 5/1/2016 2000-2017 [employer]. [address] [place] [address]. All rights reserved. This information is not intended as a substitute for professional medical care. Always follow your healthcare professional's instructions.      Discharge Instructions  Syncope    Syncope (fainting) is a sudden, short loss of consciousness (passing out spell). People will usually fall to the ground when they faint or slump over if seated.  At  this time your doctor does not find that your fainting spell is a sign of anything dangerous or life-threatening.  However, sometimes the signs of serious illness do not show up right away.  If you have new or worse symptoms, you may need to be seen again in the Emergency Department or by your primary doctor. Be sure to follow up as directed, or at least within 1 week.      Return to the Emergency Department if:    You faint again.     You have any significant bleeding.    You have chest pain or fast heartbeat, or if your heartbeat is irregular or skipping beats.    You feel short of breath.    You cough up any blood.    You have belly (abdominal) pain or unusual back pain.    You have ongoing vomiting (throwing up) or diarrhea, or have a black or tarry bowel movement or blood in the bowels or blood in your vomit.    You have a fever over 101 degrees.    You lose feeling or cannot move a part of your body or cannot talk normally.    You are confused, have a headache, cannot see well, or have a seizure.    DO NOT DRIVE. CALL 911 INSTEAD!    What can I do to help myself?    Follow any specific instructions that your provider discussed with you.    If you feel light-headed, make sure to sit down right away, even if you have to sit on the floor.    Follow up with your regular medical doctor as discussed for further management. This may include lowering your blood pressure medications, insulin or other diabetic medications, checking your blood sugar more frequently, and drinking more fluids, taking medicines for vomiting or diarrhea or getting up slower.  If you were given a prescription for medicine here today, be sure to read all of the information (including the package insert) that comes with your prescription.  This will include important information about the medicine, its side effects, and any warnings that you need to know about.  The pharmacist who fills the prescription can provide more information and answer  questions you may have about the medicine.  If you have questions or concerns that the pharmacist cannot address, please call or return to the Emergency Department.     Opioid Medication Information    Pain medications are among the most commonly prescribed medicines, so we are including this information for all our patients. If you did not receive pain medication or get a prescription for pain medicine, you can ignore it.     You may have been given a prescription for an opioid (narcotic) pain medicine and/or have received a pain medicine while here in the Emergency Department. These medicines can make you drowsy or impaired. You must not drive, operate dangerous equipment, or engage in any other dangerous activities while taking these medications. If you drive while taking these medications, you could be arrested for DUI, or driving under the influence. Do not drink any alcohol while you are taking these medications.     Opioid pain medications can cause addiction. If you have a history of chemical dependency of any type, you are at a higher risk of becoming addicted to pain medications.  Only take these prescribed medications to treat your pain when all other options have been tried. Take it for as short a time and as few doses as possible. Store your pain pills in a secure place, as they are frequently stolen and provide a dangerous opportunity for children or visitors in your house to start abusing these powerful medications. We will not replace any lost or stolen medicine.  As soon as your pain is better, you should flush all your remaining medication.     Many prescription pain medications contain Tylenol  (acetaminophen), including Vicodin , Tylenol #3 , Norco , Lortab , and Percocet .  You should not take any extra pills of Tylenol  if you are using these prescription medications or you can get very sick.  Do not ever take more than 3000 mg of acetaminophen in any 24 hour period.    All opioids tend to  cause constipation. Drink plenty of water and eat foods that have a lot of fiber, such as fruits, vegetables, prune juice, apple juice and high fiber cereal.  Take a laxative if you don t move your bowels at least every other day. Miralax , Milk of Magnesia, Colace , or Senna  can be used to keep you regular.      Remember that you can always come back to the Emergency Department if you are not able to see your regular doctor in the amount of time listed above, if you get any new symptoms, or if there is anything that worries you.

## 2018-07-29 NOTE — ED AVS SNAPSHOT
Emergency Department    [address] [facility] [address]    Phone:  [phone]    Fax:  [phone]                                       Leigha Mauro   MRN: [id number]    Department:   Emergency Department   Date of Visit:  7/29/2018           Patient Information     Date Of Birth          1990        Your diagnoses for this visit were:     Syncope, unspecified syncope type        You were seen by Zeeshan Jimenez MD.      Follow-up Information     Follow up with Clinic, [facility]. Schedule an appointment as soon as possible for a visit in 3 days.    Why:  As needed, For repeat evaluation and symptom check    Contact information:    [address]  [facility] [id number] [phone]          Follow up with  Emergency Department.    Specialty:  EMERGENCY MEDICINE    Why:  If symptoms worsen    Contact information:    [address] [facility] [id number] [phone]        Discharge Instructions         Causes of Syncope  Syncope (fainting) has many causes. Sometimes it is not serious. In other cases, syncope is a sign of a heart problem. But treatment can help    When syncope is not serious  Your healthcare provider may call your problem vasovagal syncope, reflex syncope, or orthostatic hypotension. These types of syncope are generally not serious. They can be caused by:    Strong feelings, such as anxiety or fear. A nerve signal may briefly change your heart rate and lower your blood pressure too much.    Standing for too long. Standing may cause blood to pool in your legs. When this happens, your brain may not receive all the blood it needs.    Standing up too quickly. Your blood pressure may not adjust fast enough to changes in posture and may drop too low. Certain medicines can also cause this problem. Examples of medicines that can cause a drop in blood pressure include diuretics, blood pressure medicines,  and medicines for chest pain. Your pharmacist or healthcare provider can discuss these with you.    Reaction to normal body functions. When you go to the bathroom, have gastrointestinal discomfort, nausea, or pain, your heart may have a natural reflex to slow down and lower blood pressure. This can result in syncope. This may also follow exercise, eating, laughter, weight lifting, or playing musical instruments like the trumpet or trombone.  When heart trouble causes syncope  A heart problem can decrease the amount of oxygen-rich blood that reaches the brain. Heart trouble can be serious and even life threatening if not treated:    A slow heart rate. Electrical signals tell the chambers of the heart when to pump. But the signals may be slowed or blocked (heart block) as they travel on the heart s electrical pathways. This can be caused by aging, scarred heart tissue, or damage from heart disease. When the heart rate slows, not enough blood is pumped.    A fast heart rate. Certain problems can make the heart race. For instance, after a heart attack, also known as acute myocardial infarction, or AMI, abnormal electrical signals may be created. These signals can make the heart suddenly beat very fast. The heart pumps before the chambers can fill with blood. So less blood reaches the brain and other parts of the body. Illegal drugs, certain medicines, heart disease, or an inherited condition can also cause this.    A heart valve problem. Blood travels through the chambers of the heart as it is pumped. Heart valves open and close to help move blood in the right direction. But a valve may not open or close fully, if it s hardened or scarred. As a result, less blood is pumped through the heart to the brain and body. Most often, syncope occurs when a person's aortic valve is critically narrowed and he or she participates in  a strenuous activity.    A heart muscle problem. Some people develop a thickened heart muscle that  blocks blood flow out of the heart to the body. This is called hypertrophic cardiomyopathy. Being dehydrated and having hypertrophic cardiomyopathy can increase the risk for syncope.  Whatever the cause of syncope, it is important to be evaluated by your healthcare provider. You may need to be seen by a cardiologist, neurologist, or an ear, nose, and throat specialist. Do not drive, operate heavy machinery, or participate in activities in which you would be at risk for falls and injury if you have syncope and have not been evaluated.  Date Last Reviewed: 5/1/2016 2000-2017 [employer]. [address] [place] [id number]. All rights reserved. This information is not intended as a substitute for professional medical care. Always follow your healthcare professional's instructions.      Discharge Instructions  Syncope    Syncope (fainting) is a sudden, short loss of consciousness (passing out spell). People will usually fall to the ground when they faint or slump over if seated.  At this time your doctor does not find that your fainting spell is a sign of anything dangerous or life-threatening.  However, sometimes the signs of serious illness do not show up right away.  If you have new or worse symptoms, you may need to be seen again in the Emergency Department or by your primary doctor. Be sure to follow up as directed, or at least within 1 week.      Return to the Emergency Department if:    You faint again.     You have any significant bleeding.    You have chest pain or fast heartbeat, or if your heartbeat is irregular or skipping beats.    You feel short of breath.    You cough up any blood.    You have belly (abdominal) pain or unusual back pain.    You have ongoing vomiting (throwing up) or diarrhea, or have a black or tarry bowel movement or blood in the bowels or blood in your vomit.    You have a fever over 101 degrees.    You lose feeling or cannot move a part of your body or cannot  talk normally.    You are confused, have a headache, cannot see well, or have a seizure.    DO NOT DRIVE. CALL 911 INSTEAD!    What can I do to help myself?    Follow any specific instructions that your provider discussed with you.    If you feel light-headed, make sure to sit down right away, even if you have to sit on the floor.    Follow up with your regular medical doctor as discussed for further management. This may include lowering your blood pressure medications, insulin or other diabetic medications, checking your blood sugar more frequently, and drinking more fluids, taking medicines for vomiting or diarrhea or getting up slower.  If you were given a prescription for medicine here today, be sure to read all of the information (including the package insert) that comes with your prescription.  This will include important information about the medicine, its side effects, and any warnings that you need to know about.  The pharmacist who fills the prescription can provide more information and answer questions you may have about the medicine.  If you have questions or concerns that the pharmacist cannot address, please call or return to the Emergency Department.     Opioid Medication Information    Pain medications are among the most commonly prescribed medicines, so we are including this information for all our patients. If you did not receive pain medication or get a prescription for pain medicine, you can ignore it.     You may have been given a prescription for an opioid (narcotic) pain medicine and/or have received a pain medicine while here in the Emergency Department. These medicines can make you drowsy or impaired. You must not drive, operate dangerous equipment, or engage in any other dangerous activities while taking these medications. If you drive while taking these medications, you could be arrested for DUI, or driving under the influence. Do not drink any alcohol while you are taking these  medications.     Opioid pain medications can cause addiction. If you have a history of chemical dependency of any type, you are at a higher risk of becoming addicted to pain medications.  Only take these prescribed medications to treat your pain when all other options have been tried. Take it for as short a time and as few doses as possible. Store your pain pills in a secure place, as they are frequently stolen and provide a dangerous opportunity for children or visitors in your house to start abusing these powerful medications. We will not replace any lost or stolen medicine.  As soon as your pain is better, you should flush all your remaining medication.     Many prescription pain medications contain Tylenol  (acetaminophen), including Vicodin , Tylenol #3 , Norco , Lortab , and Percocet .  You should not take any extra pills of Tylenol  if you are using these prescription medications or you can get very sick.  Do not ever take more than 3000 mg of acetaminophen in any 24 hour period.    All opioids tend to cause constipation. Drink plenty of water and eat foods that have a lot of fiber, such as fruits, vegetables, prune juice, apple juice and high fiber cereal.  Take a laxative if you don t move your bowels at least every other day. Miralax , Milk of Magnesia, Colace , or Senna  can be used to keep you regular.      Remember that you can always come back to the Emergency Department if you are not able to see your regular doctor in the amount of time listed above, if you get any new symptoms, or if there is anything that worries you.        24 Hour Appointment Hotline       To make an appointment at any [facility], call [phone] ([phone]). If you don't have a family doctor or clinic, we will help you find one. [place] clinics are conveniently located to serve the needs of you and your family.             Review of your medicines      Our records show that you are taking the medicines listed  below. If these are incorrect, please call your family doctor or clinic.        Dose / Directions Last dose taken    * COMPOUNDED NON-CONTROLLED SUBSTANCE - PHARMACY TO MIX COMPOUNDED MEDICATION   Commonly known as:  CMPD RX   Dose:  1 applicator   Quantity:  30 g        Place 1 applicator rectally 2 times daily   Refills:  0        * COMPOUNDED NON-CONTROLLED SUBSTANCE - PHARMACY TO MIX COMPOUNDED MEDICATION   Commonly known as:  CMPD RX   Dose:  1 applicator   Quantity:  30 g        Place 1 applicator rectally 2 times daily   Refills:  0        etonogestrel 68 MG Impl   Commonly known as:  IMPLANON/NEXPLANON   Dose:  1 each        1 each by Subdermal route once.   Refills:  0        * FLUoxetine 10 MG capsule   Commonly known as:  PROzac   Dose:  10 mg   Quantity:  30 capsule        Take 1 capsule by mouth daily.   Refills:  1        * FLUoxetine 40 MG capsule   Commonly known as:  PROZAC   Dose:  40 mg   Quantity:  30 capsule        Take 1 capsule (40 mg) by mouth daily   Refills:  0        GABAPENTIN PO   Dose:  100 mg        Take 100 mg by mouth 3 times daily   Refills:  0        ibuprofen 200 MG tablet   Commonly known as:  ADVIL/MOTRIN   Dose:  1-2 tablet        Take 1-2 tablets by mouth every 6 hours as needed.   Refills:  0        METHOTREXATE PO        Refills:  0        traZODone 100 MG tablet   Commonly known as:  DESYREL   Dose:  200 mg   Quantity:  60 tablet        Take 2 tablets (200 mg) by mouth At Bedtime   Refills:  0        VYVANSE PO   Dose:  50 mg        Take 50 mg by mouth daily   Refills:  0        * Notice:  This list has 4 medication(s) that are the same as other medications prescribed for you. Read the directions carefully, and ask your doctor or other care provider to review them with you.            Procedures and tests performed during your visit     Basic metabolic panel    CBC with platelets differential    EKG 12 lead    HCG qualitative urine    UA with Microscopic    XR Chest 2 Views       Orders Needing Specimen Collection     None      Pending Results     No orders found from 7/27/2018 to 7/30/2018.            Pending Culture Results     No orders found from 7/27/2018 to 7/30/2018.            Pending Results Instructions     If you had any lab results that were not finalized at the time of your Discharge, you can call the ED Lab Result RN at [phone]. You will be contacted by this team for any positive Lab results or changes in treatment. The nurses are available 7 days a week from 10A to 6:30P.  You can leave a message 24 hours per day and they will return your call.        Test Results From Your Hospital Stay        7/29/2018  4:55 PM      Component Results     Component Value Ref Range & Units Status    WBC 6.8 4.0 - 11.0 10e9/L Final    RBC Count 4.17 3.8 - 5.2 10e12/L Final    Hemoglobin 11.4 (L) 11.7 - 15.7 g/dL Final    Hematocrit 34.7 (L) 35.0 - 47.0 % Final    MCV 83 78 - 100 fl Final    MCH 27.3 26.5 - 33.0 pg Final    MCHC 32.9 31.5 - 36.5 g/dL Final    RDW 14.9 10.0 - 15.0 % Final    Platelet Count 312 150 - 450 10e9/L Final    Diff Method Automated Method  Final    % Neutrophils 50.5 % Final    % Lymphocytes 37.9 % Final    % Monocytes 8.7 % Final    % Eosinophils 1.9 % Final    % Basophils 0.7 % Final    % Immature Granulocytes 0.3 % Final    Nucleated RBCs 0 0 /100 Final    Absolute Neutrophil 3.4 1.6 - 8.3 10e9/L Final    Absolute Lymphocytes 2.6 0.8 - 5.3 10e9/L Final    Absolute Monocytes 0.6 0.0 - 1.3 10e9/L Final    Absolute Eosinophils 0.1 0.0 - 0.7 10e9/L Final    Absolute Basophils 0.1 0.0 - 0.2 10e9/L Final    Abs Immature Granulocytes 0.0 0 - 0.4 10e9/L Final    Absolute Nucleated RBC 0.0  Final         7/29/2018  5:08 PM      Component Results     Component Value Ref Range & Units Status    Sodium 141 133 - 144 mmol/L Final    Potassium 3.2 (L) 3.4 - 5.3 mmol/L Final    Chloride 106 94 - 109 mmol/L Final    Carbon Dioxide 28 20 - 32 mmol/L Final    Anion Gap 7 3 -  14 mmol/L Final    Glucose 89 70 - 99 mg/dL Final    Urea Nitrogen 9 7 - 30 mg/dL Final    Creatinine 0.82 0.52 - 1.04 mg/dL Final    GFR Estimate 83 >60 mL/min/1.7m2 Final    Non  GFR Calc    GFR Estimate If Black >90 >60 mL/min/1.7m2 Final    African American GFR Calc    Calcium 8.8 8.5 - 10.1 mg/dL Final         7/29/2018  5:01 PM      Component Results     Component Value Ref Range & Units Status    HCG Qual Urine Negative NEG^Negative Final    This test is for screening purposes.  Results should be interpreted along with   the clinical picture.  Confirmation testing is available if warranted by   ordering [id number], HCG Quantitative Pregnancy.           7/29/2018  4:58 PM      Component Results     Component Value Ref Range & Units Status    Color Urine Straw  Final    Appearance Urine Clear  Final    Glucose Urine Negative NEG^Negative mg/dL Final    Bilirubin Urine Negative NEG^Negative Final    Ketones Urine Negative NEG^Negative mg/dL Final    Specific Gravity Urine 1.002 (L) 1.003 - 1.035 Final    Blood Urine Negative NEG^Negative Final    pH Urine 7.5 (H) 5.0 - 7.0 pH Final    Protein Albumin Urine Negative NEG^Negative mg/dL Final    Urobilinogen mg/dL Normal 0.0 - 2.0 mg/dL Final    Nitrite Urine Negative NEG^Negative Final    Leukocyte Esterase Urine Negative NEG^Negative Final    Source Midstream Urine  Final    WBC Urine 0 0 - 5 /HPF Final    RBC Urine <1 0 - 2 /HPF Final    Bacteria Urine Few (A) NEG^Negative /HPF Final    Squamous Epithelial /HPF Urine 1 0 - 1 /HPF Final    Mucous Urine Present (A) NEG^Negative /LPF Final         7/29/2018  5:59 PM      Narrative     XR CHEST 2 VW   7/29/2018 5:44 PM     HISTORY: chest pain, near syncope;     COMPARISON: None.    FINDINGS: The heart is negative. There is a calcified granuloma  projected over the right 6 rib.  The lungs are clear. The pulmonary  vasculature is normal.  The bones and soft tissues are unremarkable.        Impression      IMPRESSION: No active infiltrate. No change.        JACLYN SANDY MD                Clinical Quality Measure: Blood Pressure Screening     Your blood pressure was checked while you were in the emergency department today. The last reading we obtained was  BP: 98/71 . Please read the guidelines below about what these numbers mean and what you should do about them.  If your systolic blood pressure (the top number) is less than 120 and your diastolic blood pressure (the bottom number) is less than 80, then your blood pressure is normal. There is nothing more that you need to do about it.  If your systolic blood pressure (the top number) is 120-139 or your diastolic blood pressure (the bottom number) is 80-89, your blood pressure may be higher than it should be. You should have your blood pressure rechecked within a year by a primary care provider.  If your systolic blood pressure (the top number) is 140 or greater or your diastolic blood pressure (the bottom number) is 90 or greater, you may have high blood pressure. High blood pressure is treatable, but if left untreated over time it can put you at risk for heart attack, stroke, or kidney failure. You should have your blood pressure rechecked by a primary care provider within the next 4 weeks.  If your provider in the emergency department today gave you specific instructions to follow-up with your doctor or provider even sooner than that, you should follow that instruction and not wait for up to 4 weeks for your follow-up visit.        Thank you for choosing [facility]       Thank you for choosing [facility] for your care. Our goal is always to provide you with excellent care. Hearing back from our patients is one way we can continue to improve our services. Please take a few minutes to complete the written survey that you may receive in the mail after you visit with us. Thank you!        [employer] Information     [employer] lets you send messages to your doctor, view your test  "results, renew your prescriptions, schedule appointments and more. To sign up, go to [email or web address] . Click on \"Log in\" on the left side of the screen, which will take you to the Welcome page. Then click on \"Sign up Now\" on the right side of the page.     You will be asked to enter the access code listed below, as well as some personal information. Please follow the directions to create your username and password.     Your access code is: [email or web address]  Expires: 10/27/2018  6:12 PM     Your access code will  in 90 days. If you need help or a new code, please call your [facility] clinic or [phone].        Care EveryWhere ID     This is your Care EveryWhere ID. This could be used by other organizations to access your [facility] medical records  [id number]        Equal Access to Services     CURT ANGEL : Esperanza Ndiaye, earnest bentley, anisa mcallisteralnoe [place], kim howard . So [facility] [phone].    ATENCIÓN: Si habla español, tiene a meredith disposición servicios gratuitos de asistencia lingüística. Llame al [phone].    We comply with applicable federal civil rights laws and [place] laws. We do not discriminate on the basis of race, color, national origin, age, disability, sex, sexual orientation, or gender identity.            After Visit Summary       This is your record. Keep this with you and show to your community pharmacist(s) and doctor(s) at your next visit.                  "

## 2018-07-29 NOTE — ED PROVIDER NOTES
"  History     Chief Complaint:    Dizziness and Chest Pain     HPI   Leigha Mauro is a 28 year old female with a eating disorder, depression, and a recovering alcoholic who presents to the ED for evaluation chest pain and dizziness. The patient reports that she has been having symptoms of intermittent dizziness for the past few weeks in addition to chest pain for the past week. Today, she states that she was dizzy and light headed while she was shopping at a [employer]. She did not actually faint or lose consciousness at any point but had to sit down multiple times during the trip. She states that she has been in contact with her dietician regarding her symptoms of intermittent dizziness. She notes that she has been eating dinner for the past few days, however, that appears to be the extent of her food intake recently. She states that she has not eaten anything today. She denies any symptoms of dysuria or abdominal pain. Of note, the patient states that she is not currently having periods secondary to a birth control implant.     Allergies:  Augmentin  Cephalosporins  Penicillins  Sulfa drugs     Medications:    Implanon  Prozac  Gabapentin  Vyvanse  Methotrexate  Trazadone     Past Medical History:    Acne  Alcohol related seizure  Depressive disorder  Eating disorder    Past Surgical History:    The patient does not have any pertinent past surgical history.    Family History:    No past pertinent family history.    Social History:  Current every day smoker.   Sober for a year and a half. History of alcohol dependence.  Marital Status:  Single [1]     Review of Systems   Gastrointestinal: Negative for abdominal pain.   Genitourinary: Negative for dysuria.   Neurological: Positive for dizziness and light-headedness. Negative for syncope.   All other systems reviewed and are negative.        Physical Exam   First Vitals:  BP: 92/66  Heart Rate: 80  Temp: 98.2  F (36.8  C)  Resp: 14  Height: 172.7 cm (5' 8\")  Weight: " 56.7 kg (125 lb)  SpO2: 96 %      Physical Exam  Vitals: reviewed by me  General: [place] seen [place], pleasant, cooperative, and alert to conversation  Eyes: Tracking well, clear conjunctiva BL  ENT: MMM, midline trachea.   Lungs:  No tachypnea, no accessory muscle use. No respiratory distress.   CV: Rate as above, regular rhythm.    Abd: Soft, non tender, no guarding, no rebound. Non distended  MSK: no peripheral edema or joint effusion.  No evidence of trauma  Skin: No rash, normal turgor and temperature  Neuro: Clear speech and no facial droop.  Psych: Not RIS, no e/o AH/VH.  Denies SI or HI, states she feels safe at home       Emergency Department Course   ECG:  Indication: Chest pain  Time: 1624  Vent. Rate 68 bpm. AL interval 146. QRS duration 82. QT/QTc 420/446. P-R-T axis 18 88 64.  Normal sinus rhythm. Normal ECG.      Imaging:  Radiographic findings were communicated with the patient who voiced understanding of the findings.  XR Chest 2 views:   No active infiltrate. No change, as per radiology.       Laboratory:  CBC: WBC: 6.8, HGB: 11.4 (L), PLT: 312  BMP: Glucose 89, Potassium 3.2 (L), o/w WNL (Creatinine: 0.82)    HCG: Negative  UA with Microscopic: Specific gravity 1.002 (L), pH urine 7.5 (H), Bacteria Few (A), Mucous urine Present (A), o/w WNL      Interventions:   1700 NS 1,000 mLs IV      Emergency Department Course:  Nursing notes and vitals reviewed. (9276) I performed an exam of the patient as documented above.     EKG obtained in the ED, see results above.     The patient provided a urine sample here in the emergency department. This was sent for laboratory testing, findings above.     IV inserted. Fluid administered as above. Blood drawn. This was sent to the lab for further testing, results above.     The patient was sent for a Chest XR while in the emergency department, findings above.     9805 I rechecked the patient and discussed the results of her workup thus far.     Findings and  plan explained to the Patient. Patient discharged home with instructions regarding supportive care, medications, and reasons to return. The importance of close follow-up was reviewed.     I personally reviewed the laboratory results with the Patient and answered all related questions prior to discharge.     Impression & Plan    Medical Decision Making:  Leigha Mauro is a 28 year old female who presents to the ER with some chest discomfort for a week as well as dizzy and fatigue feeling while at [facility] [place]. No clear cause is found for this, her EKG is reassuring, with no ST changes, and no exertional symptoms. She does appear to have PO intake and borderline anorexia with one meal a day at best and this may be the reason she is so fatigued. I see no evidence of arrhythmogenic syncope on today's exam, or on her EKG. No evidence of murmer to evidence valvular syncope, and patient is well appearing here in the ER. Patient states that she thinks this is diet related and that she feels comfortable following up with her dietician and her counsellor in the following days. Her wife is at bedside as well, who is okay with this plan. Will discharge as above.   Critical Care time:  none    Diagnosis:    ICD-10-CM    1. Syncope, unspecified syncope type R55        Disposition:  discharged to home    Scribe Disclosure:  I,  Barrera Hobson, am serving as a scribe on 7/29/2018 at 4:47 PM to personally document services performed by Zeeshan Jimenez MD, based on my observations and the provider's statements to me.          Barrera Hobson  7/29/2018    EMERGENCY DEPARTMENT       Zeeshan Jimenez MD  07/29/18 2009

## 2018-09-12 ENCOUNTER — OFFICE VISIT (OUTPATIENT)
Dept: FAMILY MEDICINE | Facility: CLINIC | Age: 28
End: 2018-09-12
Attending: FAMILY MEDICINE
Payer: COMMERCIAL

## 2018-09-12 VITALS
WEIGHT: 141.9 LBS | BODY MASS INDEX: 21.5 KG/M2 | DIASTOLIC BLOOD PRESSURE: 77 MMHG | HEIGHT: 68 IN | HEART RATE: 101 BPM | SYSTOLIC BLOOD PRESSURE: 113 MMHG

## 2018-09-12 DIAGNOSIS — F41.9 ANXIETY: ICD-10-CM

## 2018-09-12 DIAGNOSIS — F90.0 ATTENTION DEFICIT HYPERACTIVITY DISORDER (ADHD), PREDOMINANTLY INATTENTIVE TYPE: ICD-10-CM

## 2018-09-12 DIAGNOSIS — K59.00 CONSTIPATION, UNSPECIFIED CONSTIPATION TYPE: ICD-10-CM

## 2018-09-12 DIAGNOSIS — F50.9 EATING DISORDER: ICD-10-CM

## 2018-09-12 DIAGNOSIS — Z23 NEED FOR DTAP VACCINE: Primary | ICD-10-CM

## 2018-09-12 DIAGNOSIS — F50.00 ANOREXIA NERVOSA (H): ICD-10-CM

## 2018-09-12 DIAGNOSIS — Z13.220 LIPID SCREENING: ICD-10-CM

## 2018-09-12 DIAGNOSIS — F33.1 MODERATE EPISODE OF RECURRENT MAJOR DEPRESSIVE DISORDER (H): ICD-10-CM

## 2018-09-12 DIAGNOSIS — Z11.4 SCREENING FOR HUMAN IMMUNODEFICIENCY VIRUS: ICD-10-CM

## 2018-09-12 DIAGNOSIS — Z11.59 ENCOUNTER FOR HCV SCREENING TEST FOR LOW RISK PATIENT: ICD-10-CM

## 2018-09-12 DIAGNOSIS — G89.29 OTHER CHRONIC PAIN: ICD-10-CM

## 2018-09-12 DIAGNOSIS — F19.10 POLYSUBSTANCE ABUSE (H): ICD-10-CM

## 2018-09-12 LAB
CHOLEST SERPL-MCNC: 181 MG/DL
ERYTHROCYTE [DISTWIDTH] IN BLOOD BY AUTOMATED COUNT: 14.2 % (ref 10–15)
HCT VFR BLD AUTO: 36.4 % (ref 35–47)
HDLC SERPL-MCNC: 80 MG/DL
HGB BLD-MCNC: 11.9 G/DL (ref 11.7–15.7)
LDLC SERPL CALC-MCNC: 84 MG/DL
MCH RBC QN AUTO: 27.6 PG (ref 26.5–33)
MCHC RBC AUTO-ENTMCNC: 32.7 G/DL (ref 31.5–36.5)
MCV RBC AUTO: 85 FL (ref 78–100)
NONHDLC SERPL-MCNC: 101 MG/DL
PLATELET # BLD AUTO: 309 10E9/L (ref 150–450)
RBC # BLD AUTO: 4.31 10E12/L (ref 3.8–5.2)
TRIGL SERPL-MCNC: 85 MG/DL
TSH SERPL DL<=0.005 MIU/L-ACNC: 1.55 MU/L (ref 0.4–4)
VIT B12 SERPL-MCNC: 900 PG/ML (ref 193–986)
WBC # BLD AUTO: 6.9 10E9/L (ref 4–11)

## 2018-09-12 PROCEDURE — 90471 IMMUNIZATION ADMIN: CPT | Mod: ZF

## 2018-09-12 PROCEDURE — 25000128 H RX IP 250 OP 636: Mod: ZF

## 2018-09-12 PROCEDURE — 80061 LIPID PANEL: CPT | Performed by: FAMILY MEDICINE

## 2018-09-12 PROCEDURE — 86803 HEPATITIS C AB TEST: CPT | Performed by: FAMILY MEDICINE

## 2018-09-12 PROCEDURE — 82306 VITAMIN D 25 HYDROXY: CPT | Performed by: FAMILY MEDICINE

## 2018-09-12 PROCEDURE — 87389 HIV-1 AG W/HIV-1&-2 AB AG IA: CPT | Performed by: FAMILY MEDICINE

## 2018-09-12 PROCEDURE — 85027 COMPLETE CBC AUTOMATED: CPT | Performed by: FAMILY MEDICINE

## 2018-09-12 PROCEDURE — 90715 TDAP VACCINE 7 YRS/> IM: CPT | Mod: ZF

## 2018-09-12 PROCEDURE — 84443 ASSAY THYROID STIM HORMONE: CPT | Performed by: FAMILY MEDICINE

## 2018-09-12 PROCEDURE — 36415 COLL VENOUS BLD VENIPUNCTURE: CPT | Performed by: FAMILY MEDICINE

## 2018-09-12 PROCEDURE — G0463 HOSPITAL OUTPT CLINIC VISIT: HCPCS

## 2018-09-12 PROCEDURE — 82607 VITAMIN B-12: CPT | Performed by: FAMILY MEDICINE

## 2018-09-12 RX ORDER — DEXTROAMPHETAMINE SULFATE 5 MG/1
TABLET ORAL
COMMUNITY
Start: 2018-08-14 | End: 2024-07-25

## 2018-09-12 NOTE — LETTER
9/19/2018         Leigha Campbell   [address]  [address]  [place] [facility] [address]        Dear Ms. Campbell:    The results of your recent test(s) listed below were normal, including thyroid and vitamin d levels; your HIV and Hep C were negative.    Results for orders placed or performed in visit on 09/12/18   TSH - Reflex to FT4   Result Value Ref Range    TSH 1.55 0.40 - 4.00 mU/L   Vitamin D Deficiency   Result Value Ref Range    Vitamin D Deficiency screening 56 20 - 75 ug/L   Vitamin B12   Result Value Ref Range    Vitamin B12 900 193 - 986 pg/mL   HIV Antigen Antibody Combo   Result Value Ref Range    HIV Antigen Antibody Combo Nonreactive NR^Nonreactive       Lipid Panel   Result Value Ref Range    Cholesterol 181 <200 mg/dL    Triglycerides 85 <150 mg/dL    HDL Cholesterol 80 >49 mg/dL    LDL Cholesterol Calculated 84 <100 mg/dL    Non HDL Cholesterol 101 <130 mg/dL   CBC with Platelets   Result Value Ref Range    WBC 6.9 4.0 - 11.0 10e9/L    RBC Count 4.31 3.8 - 5.2 10e12/L    Hemoglobin 11.9 11.7 - 15.7 g/dL    Hematocrit 36.4 35.0 - 47.0 %    MCV 85 78 - 100 fl    MCH 27.6 26.5 - 33.0 pg    MCHC 32.7 31.5 - 36.5 g/dL    RDW 14.2 10.0 - 15.0 %    Platelet Count 309 150 - 450 10e9/L   Hepatitis C antibody   Result Value Ref Range    Hepatitis C Antibody Nonreactive NR^Nonreactive         Please note that test explanations are brief and do not reflect all diagnostic uses.  If you have any questions or concerns, please call the clinic at [phone].      Sincerely,      Cr Ford MD

## 2018-09-12 NOTE — LETTER
2018       RE: Leigha Campbell  [address]  [address]  [place] [facility] [facility] [place] [address]     Dear Colleague,    Thank you for referring your patient, Leigha Campbell, to the [facility] at [facility]. Please see a copy of my visit note below.    Pt. Here for CPE, new patient    Pt. Has eating disorder and ETOH/CD, sober x 1 year, has not been for primary care since sobriety    1. ETOH/CD--ETOH drug of choice, mainly marijuana other, no IVDU. Sober x 16 months. Has been in and out treatment. Last treatment at [facility] as part of combined eating disorder program. Involved in AA, has sponsor. This is stable and going well.    2. Eating disorder--struggling since teens, first treatment age 15. Recent relapse in 2017, and now in new treatment program at [facility] in [place]. Anoxeria, not currently purging. Has needed hospitalization for eating disorder in past, last in . Seeing dietician and therapist. Weekly with both. Treatment going well, symptoms up and down.     3. Mental Health diagnoses: ADHD, PTSD, anxiety, depression--addressing through [facility], psychiatrist. Vvyanse and dextramphetamine for ADHD, trazadone, effexor, topamax, gabapentin.   4. Insomnia--out of control, working with psychiatry  5. Chronic Pain-- x 3 years, increasing severe x 6 months. Gradual onset. All over pain, with some areas more than others. Has not discussed this problem with others in past.   6. Breast--Lump in R breast, benign check in past, stable  7. Gyne--Implanon placed, 2017,  LMP 4 months ago. implanon not problematic but doesn't need it,  with female partner. Menses regular in healthier years, but with flare of eating disorder would go away completely.   no complications w/pregnancy. (child now 9) PAP 2016 normal, last abnormal -colposcopy no interevention  No vaginal concerns, no urinary concerns. No sexual concerns.  8. Bone--eat dairy, greek  "yogurt. Yoga, barre not at all these few weeks. No fhx osteoporosis  9. HCM--dtap 2008, HPV 2/3 gardisil, hep b vaccinated; [facility] not doing lab monitoring-- no vitamin supplement  ; hiv neg 2015. Hep C testing?    PMH  No surgeries  Hospitalized fr Eating disorder or ETOH withdrawal >10    FHX  Mother--fibromyalgia, htn, OA, DVT--thoracic outlet syndrome, ETOH  M. Uncle--DVT--?  Father--well  Sibs well  mgather--cad  Pat. gfather-dm, HTN  Pat. gmother--dm htn  pat uncle--htn      SH  Standard  /wife  1 child  Not working  Smoking 4 cigs/day  Sober per above  Have had several seizures as part of withdrawal.  No seizure not related to etoh    ROS   palpitations--last minutes 5-10, 3x/wk, fluttery, w/chest pain dypnea,not always related to anxiety  Chest pain--L chest pain--squeezing, not related to exertion, can last 30 min  Dyspnea--hard to walk across parking lot or 1 flight, feels exhausting, heart racing. Some dyspnea.  GI--all constipation, diarrhea, GERD  Neuro--Headaches frequent, but not severe  Lightheaded  PHQ 16, SUSHIL 17  12 point ROS negative except where noted above    PE  Blood pressure 113/77, pulse 101, height 1.727 m (5' 8\"), weight 64.4 kg (141 lb 14.4 oz), not currently breastfeeding.  Constitutional: Well appearing woman in no acute distress.   Psychological: appropriate mood and anxious.  Eyes: anicteric, normal extra-ocular movements,  pupils are equal and reactive to light.   Ears, Nose and Throat: tympanic membranes clear, nose clear and free of lesions, throat clear, moist mucous membrames, neck supple with full range of motion.    Neck: No thyroidmegaly. No jugular venous distension, no carotid bruits.  Cardiovascular: regular rate and rhythm, normal S1 and S2, no murmurs, rubs or gallops, peripheral pulses full and symmetric   Respiratory: clear to auscultation, no wheezes or crackles, normal breath sounds.  Breast: Breast and  exam not indicated for this patient according to AAFP/ " USPSTF clinical guidelinesGastrointestinal: positive bowel sounds, nontender, no hepatosplenomegaly, no masses. No guarding or rebound.  Lymphatic: no lymphadenopathy.  Musculoskeletal: full range of motion, no edema and motor strength is equal in the upper and lower extremities    Skin: no concerning lesions, no jaundice.  Neurological: cranial nerves intact, normal strength and sensation, reflexes at patella and biceps normal, normal gait, no tremor.   Monofilament Foot Exam: n/a    A/P  1. HCM--due for dTaP, recommend HIV and Hep C based on screening guidelines, due for lipid screening  2.  Palpitations--check TSH,  consider ziopatch if persists  3. Anxorexia--Check CBC, Vit d, vit b12. Discussed relationship between menses and eating disorder, need for adequate estrogen levels and bone health. Can remove implanon, but may wish to consider OCP if menses not regular  4. Constipation--Fiber daily, 6-8 glasses water 6-8 oz each.Walk daily 15 min, role of anorexia and medications.  5. Chronic pain--Discussed expectations of body, hyperreactivity of pain when now not medicated; will do further evaluation at next visit     F/u 1-2 months for palpitations and pain      Again, thank you for allowing me to participate in the care of your patient.      Sincerely,    Cr Ford MD

## 2018-09-12 NOTE — PROGRESS NOTES
Pt. Here for CPE, new patient    Pt. Has eating disorder and ETOH/CD, sober x 1 year, has not been for primary care since sobriety    1. ETOH/CD--ETOH drug of choice, mainly marijuana other, no IVDU. Sober x 16 months. Has been in and out treatment. Last treatment at [facility] as part of combined eating disorder program. Involved in AA, has sponsor. This is stable and going well.    2. Eating disorder--struggling since teens, first treatment age 15. Recent relapse in 2017, and now in new treatment program at [facility] in [place]. Anoxeria, not currently purging. Has needed hospitalization for eating disorder in past, last in . Seeing dietician and therapist. Weekly with both. Treatment going well, symptoms up and down.     3. Mental Health diagnoses: ADHD, PTSD, anxiety, depression--addressing through [facility], psychiatrist. Vvyanse and dextramphetamine for ADHD, trazadone, effexor, topamax, gabapentin.   4. Insomnia--out of control, working with psychiatry  5. Chronic Pain-- x 3 years, increasing severe x 6 months. Gradual onset. All over pain, with some areas more than others. Has not discussed this problem with others in past.   6. Breast--Lump in R breast, benign check in past, stable  7. Gyne--Implanon placed, 2017,  LMP 4 months ago. implanon not problematic but doesn't need it,  with female partner. Menses regular in healthier years, but with flare of eating disorder would go away completely.   no complications w/pregnancy. (child now 9) PAP 2016 normal, last abnormal -colposcopy no interevention  No vaginal concerns, no urinary concerns. No sexual concerns.  8. Bone--eat dairy, greek yogurt. Yoga, barre not at all these few weeks. No fhx osteoporosis  9. HCM--dtap , HPV 2/3 gardisil, hep b vaccinated; [facility] not doing lab monitoring-- no vitamin supplement  ; hiv neg . Hep C testing?    PMH  No surgeries  Hospitalized fr Eating disorder or ETOH withdrawal  ">10    FHX  Mother--fibromyalgia, htn, OA, DVT--thoracic outlet syndrome, ETOH  M. Uncle--DVT--?  Father--well  Sibs well  mgather--cad  Pat. gfather-dm, HTN  Pat. gmother--dm htn  pat uncle--htn      SH  Standard  /wife  1 child  Not working  Smoking 4 cigs/day  Sober per above  Have had several seizures as part of withdrawal.  No seizure not related to etoh    ROS   palpitations--last minutes 5-10, 3x/wk, fluttery, w/chest pain dypnea,not always related to anxiety  Chest pain--L chest pain--squeezing, not related to exertion, can last 30 min  Dyspnea--hard to walk across parking lot or 1 flight, feels exhausting, heart racing. Some dyspnea.  GI--all constipation, diarrhea, GERD  Neuro--Headaches frequent, but not severe  Lightheaded  PHQ 16, SUSHIL 17  12 point ROS negative except where noted above    PE  Blood pressure 113/77, pulse 101, height 1.727 m (5' 8\"), weight 64.4 kg (141 lb 14.4 oz), not currently breastfeeding.  Constitutional: Well appearing woman in no acute distress.   Psychological: appropriate mood and anxious.  Eyes: anicteric, normal extra-ocular movements,  pupils are equal and reactive to light.   Ears, Nose and Throat: tympanic membranes clear, nose clear and free of lesions, throat clear, moist mucous membrames, neck supple with full range of motion.    Neck: No thyroidmegaly. No jugular venous distension, no carotid bruits.  Cardiovascular: regular rate and rhythm, normal S1 and S2, no murmurs, rubs or gallops, peripheral pulses full and symmetric   Respiratory: clear to auscultation, no wheezes or crackles, normal breath sounds.  Breast: Breast and  exam not indicated for this patient according to AAFP/ USPSTF clinical guidelinesGastrointestinal: positive bowel sounds, nontender, no hepatosplenomegaly, no masses. No guarding or rebound.  Lymphatic: no lymphadenopathy.  Musculoskeletal: full range of motion, no edema and motor strength is equal in the upper and lower extremities  "   Skin: no concerning lesions, no jaundice.  Neurological: cranial nerves intact, normal strength and sensation, reflexes at patella and biceps normal, normal gait, no tremor.   Monofilament Foot Exam: n/a    A/P  1. HCM--due for dTaP, recommend HIV and Hep C based on screening guidelines, due for lipid screening  2.  Palpitations--check TSH,  consider ziopatch if persists  3. Anxorexia--Check CBC, Vit d, vit b12. Discussed relationship between menses and eating disorder, need for adequate estrogen levels and bone health. Can remove implanon, but may wish to consider OCP if menses not regular  4. Constipation--Fiber daily, 6-8 glasses water 6-8 oz each.Walk daily 15 min, role of anorexia and medications.  5. Chronic pain--Discussed expectations of body, hyperreactivity of pain when now not medicated; will do further evaluation at next visit     F/u 1-2 months for palpitations and pain

## 2018-09-12 NOTE — MR AVS SNAPSHOT
After Visit Summary   2018    Leigha Campbell    MRN: [id number]           Patient Information     Date Of Birth          1990        Visit Information        Provider Department      2018 2:00 PM Cr Ford MD [facility]        Today's Diagnoses     Need for DTaP vaccine    -  1    Constipation, unspecified constipation type        Eating disorder        Lipid screening        Screening for human immunodeficiency virus        Encounter for HCV screening test for low risk patient        Polysubstance abuse        Anorexia nervosa        Attention deficit hyperactivity disorder (ADHD), predominantly inattentive type        Anxiety        Moderate episode of recurrent major depressive disorder (H)        Other chronic pain           Follow-ups after your visit        Follow-up notes from your care team     Return in about 1 month (around 10/12/2018).      Who to contact     Please call your clinic at [phone] to:    Ask questions about your health    Make or cancel appointments    Discuss your medicines    Learn about your test results    Speak to your doctor            Additional Information About Your Visit        MyChart Information     [employer] is an electronic gateway that provides easy, online access to your medical records. With [employer], you can request a clinic appointment, read your test results, renew a prescription or communicate with your care team.     To sign up for [employer] visit the website at [email or web address]   You will be asked to enter the access code listed below, as well as some personal information. Please follow the directions to create your username and password.     Your access code is: [email or web address]  Expires: 10/27/2018  6:12 PM     Your access code will  in 90 days. If you need help or a new code, please contact your [facility] or call [phone] for assistance.        Care  "EveryWhere ID     This is your Care EveryWhere ID. This could be used by other organizations to access your [facility] medical records  [id number]        Your Vitals Were     Pulse Height BMI (Body Mass Index)             101 1.727 m (5' 8\") 21.58 kg/m2          Blood Pressure from Last 3 Encounters:   09/12/18 113/77   07/29/18 93/67   07/12/17 92/58    Weight from Last 3 Encounters:   09/12/18 64.4 kg (141 lb 14.4 oz)   07/29/18 56.7 kg (125 lb)   07/12/17 72.1 kg (159 lb)              We Performed the Following     CBC with Platelets     Hepatitis C antibody     HIV Antigen Antibody Combo     Lipid Panel     TDAP VACCINE (BOOSTRIX)     TSH - Reflex to FT4     Vitamin B12     Vitamin D Deficiency        Primary Care Provider Office Phone # Fax #    [facility] [phone] [address] [place] [address]        Equal Access to Services     CURT ANGEL : Hadii aad ku hadasho Soomaali, waaxda luqadaha, qaybta kaalmada adeegyada, waxay idiin hayaan anibal howard . So [facility] [phone].    ATENCIÓN: Si habla español, tiene a meredith disposición servicios gratuitos de asistencia lingüística. [facility] [phone].    We comply with applicable federal civil rights laws and [place] laws. We do not discriminate on the basis of race, color, national origin, age, disability, sex, sexual orientation, or gender identity.            Thank you!     Thank you for choosing [facility]  for your care. Our goal is always to provide you with excellent care. Hearing back from our patients is one way we can continue to improve our services. Please take a few minutes to complete the written survey that you may receive in the mail after your visit with us. Thank you!             Your Updated Medication List - Protect others around you: Learn how to safely use, store and throw away your medicines at www.disposemymeds.org.          This list is " accurate as of 9/12/18 11:59 PM.  Always use your most recent med list.                   Brand Name Dispense Instructions for use Diagnosis    * COMPOUNDED NON-CONTROLLED SUBSTANCE - PHARMACY TO MIX COMPOUNDED MEDICATION    CMPD RX    30 g    Place 1 applicator rectally 2 times daily    Anal pain, External hemorrhoids       * COMPOUNDED NON-CONTROLLED SUBSTANCE - PHARMACY TO MIX COMPOUNDED MEDICATION    CMPD RX    30 g    Place 1 applicator rectally 2 times daily    Anal pain, External hemorrhoids       dextroamphetamine 5 MG tablet    DEXTROSTAT     Take 1 Tab by mouth daily---Appt. 8/30        etonogestrel 68 MG Impl    IMPLANON/NEXPLANON     1 each by Subdermal route once.        * FLUoxetine 10 MG capsule    PROzac    30 capsule    Take 1 capsule by mouth daily.    Alcohol dependence (H)       * FLUoxetine 40 MG capsule    PROZAC    30 capsule    Take 1 capsule (40 mg) by mouth daily    Depressive disorder, not elsewhere classified       GABAPENTIN PO      Take 100 mg by mouth 3 times daily        ibuprofen 200 MG tablet    ADVIL/MOTRIN     Take 1-2 tablets by mouth every 6 hours as needed.        METHOTREXATE PO           traZODone 100 MG tablet    DESYREL    60 tablet    Take 2 tablets (200 mg) by mouth At Bedtime    Alcohol dependence (H)       VYVANSE PO      Take 50 mg by mouth daily        * Notice:  This list has 4 medication(s) that are the same as other medications prescribed for you. Read the directions carefully, and ask your doctor or other care provider to review them with you.

## 2018-09-13 LAB
DEPRECATED CALCIDIOL+CALCIFEROL SERPL-MC: 56 UG/L (ref 20–75)
HCV AB SERPL QL IA: NONREACTIVE
HIV 1+2 AB+HIV1 P24 AG SERPL QL IA: NONREACTIVE

## 2018-09-19 PROBLEM — F33.1 MODERATE EPISODE OF RECURRENT MAJOR DEPRESSIVE DISORDER (H): Status: ACTIVE | Noted: 2018-09-19

## 2018-09-19 PROBLEM — G89.29 OTHER CHRONIC PAIN: Status: ACTIVE | Noted: 2018-09-19

## 2018-09-19 PROBLEM — F41.9 ANXIETY: Status: ACTIVE | Noted: 2018-09-19

## 2018-09-19 PROBLEM — F90.0 ATTENTION DEFICIT HYPERACTIVITY DISORDER (ADHD), PREDOMINANTLY INATTENTIVE TYPE: Status: ACTIVE | Noted: 2018-09-19

## 2018-09-19 PROBLEM — F50.00 ANOREXIA NERVOSA (H): Status: ACTIVE | Noted: 2018-09-19

## 2019-01-17 ENCOUNTER — OFFICE VISIT (OUTPATIENT)
Dept: FAMILY MEDICINE | Facility: CLINIC | Age: 29
End: 2019-01-17
Attending: FAMILY MEDICINE
Payer: COMMERCIAL

## 2019-01-17 VITALS
BODY MASS INDEX: 21.52 KG/M2 | WEIGHT: 142 LBS | SYSTOLIC BLOOD PRESSURE: 108 MMHG | HEART RATE: 82 BPM | DIASTOLIC BLOOD PRESSURE: 71 MMHG | HEIGHT: 68 IN

## 2019-01-17 DIAGNOSIS — F50.9 EATING DISORDER: ICD-10-CM

## 2019-01-17 DIAGNOSIS — G89.4 CHRONIC PAIN SYNDROME: Primary | ICD-10-CM

## 2019-01-17 DIAGNOSIS — K59.09 CHRONIC CONSTIPATION: ICD-10-CM

## 2019-01-17 DIAGNOSIS — G47.00 PERSISTENT INSOMNIA: ICD-10-CM

## 2019-01-17 PROCEDURE — G0463 HOSPITAL OUTPT CLINIC VISIT: HCPCS | Mod: ZF

## 2019-01-17 RX ORDER — DULOXETIN HYDROCHLORIDE 20 MG/1
CAPSULE, DELAYED RELEASE ORAL
Qty: 60 CAPSULE | Refills: 3 | Status: SHIPPED | OUTPATIENT
Start: 2019-01-17 | End: 2024-07-25

## 2019-01-17 ASSESSMENT — PAIN SCALES - GENERAL: PAINLEVEL: NO PAIN (0)

## 2019-01-17 ASSESSMENT — MIFFLIN-ST. JEOR: SCORE: 1427.61

## 2019-01-17 NOTE — LETTER
1/17/2019       RE: Leigha Campbell  [address]  [address]  [facility] [address]     Dear Colleague,    Thank you for referring your patient, Leigha Campbell, to the [facility] at [facility]. Please see a copy of my visit note below.    Leigha is a 28 year old female who is referred from Chiropractor, Dr. Caputo at [facility] and [facility] to establish care and looking for a PCP:   HPI:  Saw Dr. Ford 8/2018:  Review of systems and Labs were normal a that time  HPI:   - Has struggled with different rosa issues since [fatigue and constipation] since early teens. Has had a history of eating disorder, in and out of [facility] program over the years, currently only with a ED therapist. Also a long history of ETOH/CD and sober since spring 2017.  Saw a Chiropractor, who through kinesiology diagnosed her with Lyme disease and was treating her with an herbal protocol.  She has not seen any improvement with her symptoms and wants guidance as to where to get more help.  Has not seen rheumatology or neurology.     Today presents because of intolerable, progressive chronic body pain, body tingling/numbness, racing heart, poor sleep and fatigue. Often uses a wheel chair with any shopping or any out of house acclivity. Has done  saunas, acupuncture, physical therapy without relief.   Has poor gut health with chronic bloating and constipation and uses coffee enemas multiple times in a week for detoxification and elimination. Is currently off most gluten X two months.  Is desperate for help.      - Takes trazodone at  for sleep      - Low dose gabapentin     Mental health hx:   1. ETOH/CD--ETOH sober since spring of 2017   2. Eating disorder--seeing ED therapist  3) ADHD, PTSD, anxiety, depression--addressing through psychiatrist.  ROS:  General: feels desperate with intense pain   See HPI   PROBLEM LIST:  Patient Active Problem List   Diagnosis     Chronic  constipation     Alcohol dependence in early full remission (H)     Primary insomnia     Polysubstance abuse (H)     Cannabis abuse     Fibroadenoma of breast     Anorexia nervosa     Attention deficit hyperactivity disorder (ADHD), predominantly inattentive type     Anxiety     Moderate episode of recurrent major depressive disorder (H)     Other chronic pain   OB/GYN HISTORY:  bobby braxton, 6/2017  Obstetric History     No data available      PAST MEDICAL HISTORY:  Past Medical History:   Diagnosis Date     Acne      Alcohol related seizure (H)      Depressive disorder      Eating disorder, unspecified      Other and unspecified alcohol dependence, unspecified drinking behavior 10/15/2013   Life Style Modifiers:   Tobacco:  reports that she has been smoking cigarettes.  She has a 1.25 pack-year smoking history. she has never used smokeless tobacco.   Alcohol:  reports that she does not drink alcohol.   Drug use:  reports that she does not use drugs.  Using wheel chair when going out. Not working. Not on disability.   CAM Providers:      Nutritionist: Yes    Naturopath: No    Chiropractor: Yes    Energy Healer: No    Homeopath: No    Acupuncture: Yes    PAST SURGICAL HISTORY:  Past Surgical History:   Procedure Laterality Date     NO HISTORY OF SURGERY         FAMILY HISTORY:  Family History   Problem Relation Age of Onset     Blood Disease Paternal Grandmother        SOCIAL HISTORY:  Social History     Socioeconomic History     Marital status:      Spouse name: Not on file     Number of children: Not on file     Years of education: Not on file     Highest education level: Not on file   Social Needs     Financial resource strain: Not on file     Food insecurity - worry: Not on file     Food insecurity - inability: Not on file     Transportation needs - medical: Not on file     Transportation needs - non-medical: Not on file   Occupational History     Not on file   Tobacco Use     Smoking status: Current  "Every Day Smoker     Packs/day: 0.25     Years: 5.00     Pack years: 1.25     Types: Cigarettes     Smokeless tobacco: Never Used   Substance and Sexual Activity     Alcohol use: No     Drug use: No     Comment: Marijuana     Sexual activity: Yes     Partners: Female   Other Topics Concern     Parent/sibling w/ CABG, MI or angioplasty before 65F 55M? Not Asked   Social History Narrative     Not on file   MEDICATIONS:  Current Outpatient Medications   Medication Sig Dispense Refill     COMPOUND (CMPD RX) - PHARMACY TO MIX COMPOUNDED MEDICATION Place 1 applicator rectally 2 times daily (Patient not taking: Reported on 9/12/2018) 30 g 0     COMPOUND (CMPD RX) - PHARMACY TO MIX COMPOUNDED MEDICATION Place 1 applicator rectally 2 times daily (Patient not taking: Reported on 9/12/2018) 30 g 0     dextroamphetamine (DEXTROSTAT) 5 MG tablet Take 1 Tab by mouth daily---Appt. 8/30       etonogestrel (IMPLANON) 68 MG IMPL 1 each by Subdermal route once.         FLUoxetine (PROZAC) 10 MG capsule Take 1 capsule by mouth daily. (Patient not taking: Reported on 7/12/2017) 30 capsule 1     FLUoxetine (PROZAC) 40 MG capsule Take 1 capsule (40 mg) by mouth daily (Patient not taking: Reported on 7/12/2017) 30 capsule 0     GABAPENTIN PO Take 100 mg by mouth 3 times daily       ibuprofen (ADVIL,MOTRIN) 200 MG tablet Take 1-2 tablets by mouth every 6 hours as needed.       Lisdexamfetamine Dimesylate (VYVANSE PO) Take 50 mg by mouth daily       METHOTREXATE PO        traZODone (DESYREL) 100 MG tablet Take 2 tablets (200 mg) by mouth At Bedtime 60 tablet 0   ALLERGIES:  Augmentin; Cephalosporins; Penicillins; and Sulfa drugs  VITALS:  Blood pressure 108/71, pulse 82, height 1.727 m (5' 8\"), weight 64.4 kg (142 lb), last menstrual period 12/18/2018, not currently breastfeeding.  PHYSICAL EXAM:  Constitutional: Well appearing woman who Presents with her wife, Floresita.   Psychological: depressed and tearful   Eyes: anicteric, normal " extra-ocular movements,    Neurological: normal gait, no tremor.   Total of 45 minutes was spent in direct contact with the patient and >50% of the time in patient education and coordination of care.  Diagnoses and associated orders for this visit:  Chronic pain syndrome:  Many approaches discussed and will trial a low dose of duloxetine and attempt increasing the dose if tolerated for pain management  -     DULoxetine (CYMBALTA) 20 MG capsule; One capsule daily and increase to two capsule in two weeks.    -     Follow-up with Dr. Ford to evaluate for improvement of pain and tolerance of medication   Chronic constipation        -     Discussed the need to improve her digestion as a first step to her chronic health issues.         -     Mindfulness Eating   Avoid gluten   Magnesium Glycinate at HS [400 mg]  Miralax rather than coffee enemas  Persistent insomnia        -     Will continue with Trazodone at HS        -     Add magnesium 400 mg at HS   Eating disorder        -     Continue with therapist   Referrals: consider   [facility] [place] in [place] to discuss diagnosis   Consider Lise Connolly MD/nutritionist.  [email or web address] for GI health  Follow-up with Dr. Ford in One month to review and update health status       Manuela Naidu MD

## 2019-01-17 NOTE — NURSING NOTE
Chief Complaint   Patient presents with     Establish Care     Pt here to establish care and symptom management for Lyme's disease diagnosis.

## 2019-01-17 NOTE — PROGRESS NOTES
Leigha is a 28 year old female who is referred from Chiropractor, Dr. Caputo at [facility] to establish care and looking for a PCP:   HPI:  Saw Dr. Ford 8/2018:  Review of systems and Labs were normal a that time  HPI:   - Has struggled with different rosa issues since [fatigue and constipation] since early teens. Has had a history of eating disorder, in and out of [facility] program over the years, currently only with a ED therapist. Also a long history of ETOH/CD and sober since spring 2017.  Saw a Chiropractor, who through kinesiology diagnosed her with Lyme disease and was treating her with an herbal protocol.  She has not seen any improvement with her symptoms and wants guidance as to where to get more help.  Has not seen rheumatology or neurology.     Today presents because of intolerable, progressive chronic body pain, body tingling/numbness, racing heart, poor sleep and fatigue. Often uses a wheel chair with any shopping or any out of house acclivity. Has done  saunas, acupuncture, physical therapy without relief.   Has poor gut health with chronic bloating and constipation and uses coffee enemas multiple times in a week for detoxification and elimination. Is currently off most gluten X two months.  Is desperate for help.      - Takes trazodone at HS for sleep      - Low dose gabapentin     Mental health hx:   1. ETOH/CD--ETOH sober since spring of 2017   2. Eating disorder--seeing ED therapist  3) ADHD, PTSD, anxiety, depression--addressing through psychiatrist.  ROS:  General: feels desperate with intense pain   See HPI   PROBLEM LIST:  Patient Active Problem List   Diagnosis     Chronic constipation     Alcohol dependence in early full remission (H)     Primary insomnia     Polysubstance abuse (H)     Cannabis abuse     Fibroadenoma of breast     Anorexia nervosa     Attention deficit hyperactivity disorder (ADHD), predominantly inattentive type     Anxiety     Moderate episode of  recurrent major depressive disorder (H)     Other chronic pain   OB/GYN HISTORY:  bobby braxton, 6/2017  Obstetric History     No data available      PAST MEDICAL HISTORY:  Past Medical History:   Diagnosis Date     Acne      Alcohol related seizure (H)      Depressive disorder      Eating disorder, unspecified      Other and unspecified alcohol dependence, unspecified drinking behavior 10/15/2013   Life Style Modifiers:   Tobacco:  reports that she has been smoking cigarettes.  She has a 1.25 pack-year smoking history. she has never used smokeless tobacco.   Alcohol:  reports that she does not drink alcohol.   Drug use:  reports that she does not use drugs.  Using wheel chair when going out. Not working. Not on disability.   CAM Providers:      Nutritionist: Yes    Naturopath: No    Chiropractor: Yes    Energy Healer: No    Homeopath: No    Acupuncture: Yes    PAST SURGICAL HISTORY:  Past Surgical History:   Procedure Laterality Date     NO HISTORY OF SURGERY         FAMILY HISTORY:  Family History   Problem Relation Age of Onset     Blood Disease Paternal Grandmother        SOCIAL HISTORY:  Social History     Socioeconomic History     Marital status:      Spouse name: Not on file     Number of children: Not on file     Years of education: Not on file     Highest education level: Not on file   Social Needs     Financial resource strain: Not on file     Food insecurity - worry: Not on file     Food insecurity - inability: Not on file     Transportation needs - medical: Not on file     Transportation needs - non-medical: Not on file   Occupational History     Not on file   Tobacco Use     Smoking status: Current Every Day Smoker     Packs/day: 0.25     Years: 5.00     Pack years: 1.25     Types: Cigarettes     Smokeless tobacco: Never Used   Substance and Sexual Activity     Alcohol use: No     Drug use: No     Comment: Marijuana     Sexual activity: Yes     Partners: Female   Other Topics Concern      "Parent/sibling w/ CABG, MI or angioplasty before 65F 55M? Not Asked   Social History Narrative     Not on file   MEDICATIONS:  Current Outpatient Medications   Medication Sig Dispense Refill     COMPOUND (CMPD RX) - PHARMACY TO MIX COMPOUNDED MEDICATION Place 1 applicator rectally 2 times daily (Patient not taking: Reported on 9/12/2018) 30 g 0     COMPOUND (CMPD RX) - PHARMACY TO MIX COMPOUNDED MEDICATION Place 1 applicator rectally 2 times daily (Patient not taking: Reported on 9/12/2018) 30 g 0     dextroamphetamine (DEXTROSTAT) 5 MG tablet Take 1 Tab by mouth daily---Appt. 8/30       etonogestrel (IMPLANON) 68 MG IMPL 1 each by Subdermal route once.         FLUoxetine (PROZAC) 10 MG capsule Take 1 capsule by mouth daily. (Patient not taking: Reported on 7/12/2017) 30 capsule 1     FLUoxetine (PROZAC) 40 MG capsule Take 1 capsule (40 mg) by mouth daily (Patient not taking: Reported on 7/12/2017) 30 capsule 0     GABAPENTIN PO Take 100 mg by mouth 3 times daily       ibuprofen (ADVIL,MOTRIN) 200 MG tablet Take 1-2 tablets by mouth every 6 hours as needed.       Lisdexamfetamine Dimesylate (VYVANSE PO) Take 50 mg by mouth daily       METHOTREXATE PO        traZODone (DESYREL) 100 MG tablet Take 2 tablets (200 mg) by mouth At Bedtime 60 tablet 0   ALLERGIES:  Augmentin; Cephalosporins; Penicillins; and Sulfa drugs  VITALS:  Blood pressure 108/71, pulse 82, height 1.727 m (5' 8\"), weight 64.4 kg (142 lb), last menstrual period 12/18/2018, not currently breastfeeding.  PHYSICAL EXAM:  Constitutional: Well appearing woman who Presents with her wife, Floresita.   Psychological: depressed and tearful   Eyes: anicteric, normal extra-ocular movements,    Neurological: normal gait, no tremor.   Total of 45 minutes was spent in direct contact with the patient and >50% of the time in patient education and coordination of care.  Diagnoses and associated orders for this visit:  Chronic pain syndrome:  Many approaches discussed and " will trial a low dose of duloxetine and attempt increasing the dose if tolerated for pain management  -     DULoxetine (CYMBALTA) 20 MG capsule; One capsule daily and increase to two capsule in two weeks.    -     Follow-up with Dr. Ford to evaluate for improvement of pain and tolerance of medication   Chronic constipation        -     Discussed the need to improve her digestion as a first step to her chronic health issues.         -     Mindfulness Eating   Avoid gluten   Magnesium Glycinate at HS [400 mg]  Miralax rather than coffee enemas  Persistent insomnia        -     Will continue with Trazodone at HS        -     Add magnesium 400 mg at HS   Eating disorder        -     Continue with therapist   Referrals: consider   [facility] in [place] to discuss diagnosis   Consider Lise Connolly MD/nutritionist.  [email or web address] for GI health  Follow-up with Dr. Ford in One month to review and update health status

## 2019-02-11 ENCOUNTER — MYC MEDICAL ADVICE (OUTPATIENT)
Dept: FAMILY MEDICINE | Facility: CLINIC | Age: 29
End: 2019-02-11

## 2019-02-12 ENCOUNTER — TELEPHONE (OUTPATIENT)
Dept: OBGYN | Facility: CLINIC | Age: 29
End: 2019-02-12

## 2019-02-12 NOTE — TELEPHONE ENCOUNTER
Spoke with Leigha regarding her mychart message that she is unable to come to clinic for scheduled appointment on 2/15 but wants to increase Cymbalta.  Advised that she would need to be seen in clinic and offered appointment for tomorrow. She declined and will call back to schedule.

## 2019-02-15 ENCOUNTER — HEALTH MAINTENANCE LETTER (OUTPATIENT)
Age: 29
End: 2019-02-15

## 2019-05-29 DIAGNOSIS — G89.4 CHRONIC PAIN SYNDROME: ICD-10-CM

## 2019-05-30 RX ORDER — DULOXETIN HYDROCHLORIDE 20 MG/1
CAPSULE, DELAYED RELEASE ORAL
Qty: 60 CAPSULE | Refills: 0 | OUTPATIENT
Start: 2019-05-30

## 2019-09-30 ENCOUNTER — HEALTH MAINTENANCE LETTER (OUTPATIENT)
Age: 29
End: 2019-09-30

## 2019-10-28 ENCOUNTER — HEALTH MAINTENANCE LETTER (OUTPATIENT)
Age: 29
End: 2019-10-28

## 2020-03-25 ENCOUNTER — AMBULATORY - HEALTHEAST (OUTPATIENT)
Dept: CARDIOLOGY | Facility: CLINIC | Age: 30
End: 2020-03-25

## 2020-03-25 ENCOUNTER — RECORDS - HEALTHEAST (OUTPATIENT)
Dept: ADMINISTRATIVE | Facility: OTHER | Age: 30
End: 2020-03-25

## 2020-03-27 ENCOUNTER — COMMUNICATION - HEALTHEAST (OUTPATIENT)
Dept: CARDIOLOGY | Facility: CLINIC | Age: 30
End: 2020-03-27

## 2020-04-15 ENCOUNTER — AMBULATORY - HEALTHEAST (OUTPATIENT)
Dept: CARDIOLOGY | Facility: CLINIC | Age: 30
End: 2020-04-15

## 2020-04-16 ENCOUNTER — OFFICE VISIT - HEALTHEAST (OUTPATIENT)
Dept: CARDIOLOGY | Facility: CLINIC | Age: 30
End: 2020-04-16

## 2020-04-16 DIAGNOSIS — R06.09 DYSPNEA ON EXERTION: ICD-10-CM

## 2020-04-16 DIAGNOSIS — R07.89 OTHER CHEST PAIN: ICD-10-CM

## 2020-04-16 DIAGNOSIS — R53.83 FATIGUE, UNSPECIFIED TYPE: ICD-10-CM

## 2020-04-16 DIAGNOSIS — R00.1 BRADYCARDIA: ICD-10-CM

## 2020-04-22 ENCOUNTER — HOSPITAL ENCOUNTER (OUTPATIENT)
Dept: CARDIOLOGY | Facility: CLINIC | Age: 30
Discharge: HOME | End: 2020-04-22
Attending: INTERNAL MEDICINE

## 2020-04-22 DIAGNOSIS — R06.09 DYSPNEA ON EXERTION: ICD-10-CM

## 2020-04-22 DIAGNOSIS — R00.1 BRADYCARDIA: ICD-10-CM

## 2020-04-22 DIAGNOSIS — R06.09 OTHER FORMS OF DYSPNEA: ICD-10-CM

## 2020-04-22 DIAGNOSIS — R53.83 FATIGUE, UNSPECIFIED TYPE: ICD-10-CM

## 2020-05-15 ENCOUNTER — AMBULATORY - HEALTHEAST (OUTPATIENT)
Dept: CARDIOLOGY | Facility: CLINIC | Age: 30
End: 2020-05-15

## 2020-05-15 DIAGNOSIS — R00.1 BRADYCARDIA: ICD-10-CM

## 2020-05-15 DIAGNOSIS — R06.09 DYSPNEA ON EXERTION: ICD-10-CM

## 2020-05-22 ENCOUNTER — HOSPITAL ENCOUNTER (OUTPATIENT)
Dept: CARDIOLOGY | Facility: CLINIC | Age: 30
Discharge: HOME | End: 2020-05-22
Attending: INTERNAL MEDICINE

## 2020-05-22 DIAGNOSIS — R00.1 BRADYCARDIA: ICD-10-CM

## 2020-05-22 DIAGNOSIS — R06.09 DYSPNEA ON EXERTION: ICD-10-CM

## 2020-05-22 LAB
AORTIC ROOT: 2.9 CM
BSA FOR ECHO PROCEDURE: 1.76 M2
CV BLOOD PRESSURE: NORMAL MMHG
CV ECHO HEIGHT: 69 IN
CV ECHO WEIGHT: 140 LBS
DOP CALC LVOT AREA: 3.14 CM2
DOP CALC LVOT DIAMETER: 2 CM
DOP CALC LVOT PEAK VEL: 101 CM/S
DOP CALC LVOT STROKE VOLUME: 66.6 CM3
DOP CALCLVOT PEAK VEL VTI: 21.2 CM
EJECTION FRACTION: 69 % (ref 55–75)
FRACTIONAL SHORTENING: 35.5 % (ref 28–44)
INTERVENTRICULAR SEPTUM IN END DIASTOLE: 0.61 CM (ref 0.6–0.9)
IVS/PW RATIO: 0.7
LA AREA 1: 15.5 CM2
LA AREA 2: 13.9 CM2
LEFT ATRIUM LENGTH: 4.63 CM
LEFT ATRIUM SIZE: 2.3 CM
LEFT ATRIUM TO AORTIC ROOT RATIO: 0.79 NO UNITS
LEFT ATRIUM VOLUME INDEX: 22.5 ML/M2
LEFT ATRIUM VOLUME: 39.6 ML
LEFT VENTRICLE CARDIAC INDEX: 2.7 L/MIN/M2
LEFT VENTRICLE CARDIAC OUTPUT: 4.7 L/MIN
LEFT VENTRICLE DIASTOLIC VOLUME INDEX: 57.4 CM3/M2 (ref 29–61)
LEFT VENTRICLE DIASTOLIC VOLUME: 101 CM3 (ref 46–106)
LEFT VENTRICLE HEART RATE: 71 BPM
LEFT VENTRICLE MASS INDEX: 50.5 G/M2
LEFT VENTRICLE SYSTOLIC VOLUME INDEX: 17.6 CM3/M2 (ref 8–24)
LEFT VENTRICLE SYSTOLIC VOLUME: 31 CM3 (ref 14–42)
LEFT VENTRICULAR INTERNAL DIMENSION IN DIASTOLE: 4.17 CM (ref 3.8–5.2)
LEFT VENTRICULAR INTERNAL DIMENSION IN SYSTOLE: 2.69 CM (ref 2.2–3.5)
LEFT VENTRICULAR MASS: 88.8 G
LEFT VENTRICULAR OUTFLOW TRACT MEAN GRADIENT: 2 MMHG
LEFT VENTRICULAR OUTFLOW TRACT MEAN VELOCITY: 72.7 CM/S
LEFT VENTRICULAR OUTFLOW TRACT PEAK GRADIENT: 4 MMHG
LEFT VENTRICULAR POSTERIOR WALL IN END DIASTOLE: 0.85 CM (ref 0.6–0.9)
LV STROKE VOLUME INDEX: 37.8 ML/M2
MITRAL VALVE E/A RATIO: 1.2
MV AVERAGE E/E' RATIO: 4.3 CM/S
MV DECELERATION TIME: 257 MS
MV E'TISSUE VEL-LAT: 18.4 CM/S
MV E'TISSUE VEL-MED: 17.3 CM/S
MV LATERAL E/E' RATIO: 4.1
MV MEDIAL E/E' RATIO: 4.4
MV PEAK A VELOCITY: 62.2 CM/S
MV PEAK E VELOCITY: 76 CM/S
NUC REST DIASTOLIC VOLUME INDEX: 2240 LBS
NUC REST SYSTOLIC VOLUME INDEX: 69 IN
TRICUSPID VALVE ANULAR PLANE SYSTOLIC EXCURSION: 2 CM

## 2020-05-22 ASSESSMENT — MIFFLIN-ST. JEOR: SCORE: 1419.42

## 2021-01-15 ENCOUNTER — HEALTH MAINTENANCE LETTER (OUTPATIENT)
Age: 31
End: 2021-01-15

## 2021-05-06 ENCOUNTER — HOSPITAL ENCOUNTER (EMERGENCY)
Facility: CLINIC | Age: 31
Discharge: HOME | End: 2021-05-06
Attending: EMERGENCY MEDICINE | Admitting: EMERGENCY MEDICINE
Payer: COMMERCIAL

## 2021-05-06 VITALS
DIASTOLIC BLOOD PRESSURE: 80 MMHG | WEIGHT: 145 LBS | SYSTOLIC BLOOD PRESSURE: 121 MMHG | BODY MASS INDEX: 21.48 KG/M2 | HEART RATE: 66 BPM | OXYGEN SATURATION: 97 % | TEMPERATURE: 98 F | HEIGHT: 69 IN | RESPIRATION RATE: 16 BRPM

## 2021-05-06 DIAGNOSIS — F10.920 ALCOHOLIC INTOXICATION WITHOUT COMPLICATION (H): ICD-10-CM

## 2021-05-06 LAB
ANION GAP SERPL CALCULATED.3IONS-SCNC: 5 MMOL/L (ref 3–14)
BASOPHILS # BLD AUTO: 0 10E9/L (ref 0–0.2)
BASOPHILS NFR BLD AUTO: 0.4 %
BUN SERPL-MCNC: 12 MG/DL (ref 7–30)
CALCIUM SERPL-MCNC: 8.4 MG/DL (ref 8.5–10.1)
CHLORIDE SERPL-SCNC: 110 MMOL/L (ref 94–109)
CO2 SERPL-SCNC: 28 MMOL/L (ref 20–32)
CREAT SERPL-MCNC: 0.81 MG/DL (ref 0.52–1.04)
DIFFERENTIAL METHOD BLD: ABNORMAL
EOSINOPHIL # BLD AUTO: 0.1 10E9/L (ref 0–0.7)
EOSINOPHIL NFR BLD AUTO: 0.7 %
ERYTHROCYTE [DISTWIDTH] IN BLOOD BY AUTOMATED COUNT: 15.5 % (ref 10–15)
ETHANOL SERPL-MCNC: 0.28 G/DL
GFR SERPL CREATININE-BSD FRML MDRD: >90 ML/MIN/{1.73_M2}
GLUCOSE SERPL-MCNC: 87 MG/DL (ref 70–99)
HCT VFR BLD AUTO: 33.9 % (ref 35–47)
HGB BLD-MCNC: 11 G/DL (ref 11.7–15.7)
IMM GRANULOCYTES # BLD: 0 10E9/L (ref 0–0.4)
IMM GRANULOCYTES NFR BLD: 0.1 %
LYMPHOCYTES # BLD AUTO: 2.9 10E9/L (ref 0.8–5.3)
LYMPHOCYTES NFR BLD AUTO: 41 %
MCH RBC QN AUTO: 27.1 PG (ref 26.5–33)
MCHC RBC AUTO-ENTMCNC: 32.4 G/DL (ref 31.5–36.5)
MCV RBC AUTO: 84 FL (ref 78–100)
MONOCYTES # BLD AUTO: 0.6 10E9/L (ref 0–1.3)
MONOCYTES NFR BLD AUTO: 8.2 %
NEUTROPHILS # BLD AUTO: 3.5 10E9/L (ref 1.6–8.3)
NEUTROPHILS NFR BLD AUTO: 49.6 %
NRBC # BLD AUTO: 0 10*3/UL
NRBC BLD AUTO-RTO: 0 /100
PLATELET # BLD AUTO: 363 10E9/L (ref 150–450)
POTASSIUM SERPL-SCNC: 3.5 MMOL/L (ref 3.4–5.3)
RBC # BLD AUTO: 4.06 10E12/L (ref 3.8–5.2)
SODIUM SERPL-SCNC: 143 MMOL/L (ref 133–144)
WBC # BLD AUTO: 7.1 10E9/L (ref 4–11)

## 2021-05-06 PROCEDURE — 80048 BASIC METABOLIC PNL TOTAL CA: CPT | Performed by: EMERGENCY MEDICINE

## 2021-05-06 PROCEDURE — 96374 THER/PROPH/DIAG INJ IV PUSH: CPT

## 2021-05-06 PROCEDURE — 82077 ASSAY SPEC XCP UR&BREATH IA: CPT | Performed by: EMERGENCY MEDICINE

## 2021-05-06 PROCEDURE — 99285 EMERGENCY DEPT VISIT HI MDM: CPT | Mod: 25

## 2021-05-06 PROCEDURE — 250N000011 HC RX IP 250 OP 636: Performed by: EMERGENCY MEDICINE

## 2021-05-06 PROCEDURE — 85025 COMPLETE CBC W/AUTO DIFF WBC: CPT | Performed by: EMERGENCY MEDICINE

## 2021-05-06 RX ORDER — LORAZEPAM 2 MG/ML
1 INJECTION INTRAMUSCULAR EVERY 30 MIN PRN
Status: DISCONTINUED | OUTPATIENT
Start: 2021-05-06 | End: 2021-05-06 | Stop reason: HOSPADM

## 2021-05-06 RX ORDER — CHLORDIAZEPOXIDE HYDROCHLORIDE 25 MG/1
CAPSULE, GELATIN COATED ORAL
Qty: 20 CAPSULE | Refills: 0 | Status: SHIPPED | OUTPATIENT
Start: 2021-05-06 | End: 2024-07-25

## 2021-05-06 RX ADMIN — LORAZEPAM 1 MG: 2 INJECTION INTRAMUSCULAR; INTRAVENOUS at 08:46

## 2021-05-06 ASSESSMENT — ENCOUNTER SYMPTOMS
PALPITATIONS: 1
TREMORS: 1

## 2021-05-06 ASSESSMENT — MIFFLIN-ST. JEOR: SCORE: 1442.1

## 2021-05-06 NOTE — ED PROVIDER NOTES
"  History   Chief Complaint:  Withdrawal    The history is provided by the patient.      Leigha Campbell is a 31 year old female with history of alcohol abuse and withdrawal seizures who presents with concern for alcohol withdrawal. The patient had been in remission from alcohol abuse for 4 years on 4/16/2021. However, since then she relapsed while staying with her parents as they also struggle with alcohol abuse. She has been staying with her parents due to a recent divorce. She moved to a hotel to decrease her temptation, but has continued drinking the last few days. Her last drink was just before EMS picked her up. Her last alcohol withdrawal seizure was 4 years ago when she stopped drinking. She states that she is able to feel the seizures coming on and reports feeling these symptoms today, including black spots in her vision, blurry vision, pounding heart beat, twitching, and tremulous. The patient ultimately called EMS as she was concerned for having a seizure while alone.     Review of Systems   Eyes: Positive for visual disturbance.   Cardiovascular: Positive for palpitations.   Neurological: Positive for tremors. Seizures: Concerned for onset of seizure.   All other systems reviewed and are negative.    Allergies:  Augmentin  Cephalosporins  Penicillins  Sulfa Drugs    Medications:  Dextrostat  Cymbalta  Implanon  Gabapentin  Trazodone     Past Medical History:    Alcohol withdrawal seizure,  Depression  Eating disorder  Alcohol dependence  ADHD  Anxiety  Chronic pain  Insomnia  Fibroadenoma of breast      Past Surgical History:    Laura teeth extraction    Family History:    Mother and father - alcohol use     Social History:  Unaccompanied to the ED.     Physical Exam     Patient Vitals for the past 24 hrs:   BP Temp Temp src Pulse Resp SpO2 Height Weight   05/06/21 0915 -- -- -- -- -- 97 % -- --   05/06/21 0826 121/80 98  F (36.7  C) Oral 66 16 97 % 1.753 m (5' 9\") 65.8 kg (145 lb)       Physical " Exam  General/Appearance: appears stated age, well-groomed, appears comfortable  Eyes: EOMI, no scleral injection, no icterus  ENT: MMM  Neck: supple, nl ROM, no stiffness  Cardiovascular: RRR, nl S1S2, no m/r/g, 2+ pulses in all 4 extremities, cap refill <2sec  Respiratory: CTAB, good air movement throughout, no wheezes/rhonchi/rales, no increased WOB, no retractions  Back: no lesions  GI: abd soft, non-distended, nttp,  no HSM, no rebound, no guarding, nl BS  MSK: OVIEDO, good tone, no bony abnormality  Skin: warm and well-perfused, no rash, no edema, no ecchymosis, nl turgor  Neuro: GCS 15, alert and oriented, no gross focal neuro deficits except for mild bilateral tremulousness to hands  Psych: tearful, cooperative  Heme: no petechia, no purpura, no active bleeding    Emergency Department Course     Laboratory:  CBC: WBC 7.1, HGB 11.0 (L),    BMP: chloride 110 (H), calcium 8.4 (L) o/w WNL (Creatinine 0.81)  Alcohol level blood: 0.28 (H)    Emergency Department Course:    Reviewed:  0820 I reviewed vitals, past medical history and care everywhere    Assessments:  0825 I obtained history and examined the patient as noted above.   0955 I rechecked the patient and explained findings.     Interventions:  0846 Ativan 1 mg IV    Disposition:  The patient was discharged to home.       Impression & Plan   Medical Decision Making:  This patient is a very pleasant 31-year-old female with history of alcoholism who presents today with alcohol intoxication and concerned that she is having symptoms that are similar to those she has had when she has had an alcohol withdrawal seizure.  She was mildly tremulous when she arrived but blood pressure and heart rate were unremarkable.  1 of Ativan made her symptoms improved.  I think it is reasonable for her to be discharged on a Librium taper both to help try to prevent continued drinking but also prevent withdrawal seizures.  She feels comfortable with this plan.  Of note we did  discuss detox but at this point in time she is not interested in going.  Diagnosis:    ICD-10-CM    1. Alcoholic intoxication without complication (H)  F10.920        Discharge Medications:  New Prescriptions    CHLORDIAZEPOXIDE (LIBRIUM) 25 MG CAPSULE    Day 1 - Take 2 tablets QID, Day 2 - Take 2 tablets TID, Day 3 - Take 2 tablets BID, Day 4 - Take 2 tablets once       Scribe Disclosure:  IMikayla, am serving as a scribe at 8:33 AM on 5/6/2021 to document services personally performed by Radha Shepherd MD based on my observations and the provider's statements to me.          Radha Shepherd MD  05/06/21 100

## 2021-05-06 NOTE — ED NOTES
Bed: ED16  Expected date:   Expected time:   Means of arrival:   Comments:  Leonila - 513 - 31 F ETOH eta 0894

## 2021-05-06 NOTE — ED TRIAGE NOTES
In April of 2021 pt was 4 years sober. Pt has been binge drinking 1/2 liter of vodka for past 5 days. Concerned about withdrawing so pt called 911 to control her symptoms. Last drink was 1 hour ago.

## 2021-06-04 VITALS — WEIGHT: 140 LBS | HEIGHT: 69 IN | BODY MASS INDEX: 20.73 KG/M2

## 2021-06-07 NOTE — TELEPHONE ENCOUNTER
Order Holter, R/S Consult with [facility] when video visits available.     LM for patient to callback. -lianne

## 2021-06-07 NOTE — PROGRESS NOTES
Review of systems was done over the phone and positive for chest pain, fatigue, bradycardia. All other review of systems are within normal limits.

## 2021-06-29 NOTE — PROGRESS NOTES
"Progress Notes by Law Wilkins MD at 4/16/2020  9:50 AM     Author: Law Wilkins MD Service: -- Author Type: Physician    Filed: 4/16/2020 10:44 AM Encounter Date: 4/16/2020 Status: Signed    : Law Wilkins MD (Physician)           The patient has been notified of following:     \"This video visit will be conducted via a call between you and your physician/provider. We have found that certain health care needs can be provided without the need for an in-person physical exam.  This service lets us provide the care you need with a video conversation.  If a prescription is necessary we can send it directly to your pharmacy.  If lab work is needed we can place an order for that and you can then stop by our lab to have the test done at a later time.      Patient has given verbal consent to a Video visit? Yes    HEART CARE VIDEO ENCOUNTER        The patient has chosen to have the visit conducted as a video visit, to reduce risk of exposure given the current status of Coronavirus in our community. This video visit is being conducted via a call between the patient and physician/provider. Health care needs are being provided without a physical exam.     Assessment/Recommendations   Assessment/Plan:Bradycardia, fatigue, CP in setting of Lymes Disease. Would like to start with a 2 week ACT monitor which we can mail to her. Will likely also get an echo but await monitor results. Discussed that lymes disease can alter the conduction system and cause heart block.      I have reviewed the note as documented.  This accurately captures the substance of my conversation with the patient.    Total time of video between patient and provider was 27 minutes   Start Time: 9:52 am  Stop Time:10:19am    Originating Location (pt. Location): Home    Distant Location (provider location):  [facility]  [place]    Mode of Communication:  Video Conference via [email or web address]       History of Present Illness/Subjective  "   Leigha Campbell is a 30 y.o. female who is being evaluated via a billable video visit and has consented to a video visit. Leigha Campbell has a history of chronic lymes disease and has noted exreme debilitating fatigue, as well as bradycardia. She has not felt normal for many years. She uses a wheelchair when leaving her apartment because of fatigue and dyspnea on exertion. The symptoms have gradually worsened over the past several months.  Her Apple watch will record HR's down into the 30's and does not diagnose A-Fib.  She gets passing out type spells which generally occur with standing and are associated with a shaking/trembling feeling. Last a few minutes and has fallen. Spells can be associated with nausea.  Intermittent CP which is sharp, in L upper chest and below both breasts near rib cage. Lasts about a minute and can come and go.  No diabetes, hyperlipidemia, hypertension, and quit smoking this year. Grandmother had a heart attack at a young age.    No RF, murmur, orthopnea, PND and minimal edema.    . Did synchronized swimming in her youth with excellent ability to hold her breath.        I have reviewed and updated the patient's Past Medical History, Social History, Family History and Medication List.     Physical Examination performed via live video encounter Review of Systems   General Appearance:   no distress, normal body habitus, upright.   ENT/Mouth: membranes moist, no nasal discharge or bleeding gums.  Normal head shape, no evidence of injury or laceration.     EYES:  no scleral icterus, normal conjunctivae   Neck: no evidence of thyromegaly.  Supple   Chest/Lungs:   No audible wheezing equal chest wall expansion. Non labored breathing.  No cough.   Cardiovascular:   No evidence of elevated jugular venous pressure.  No evidence of pitting edema bilaterally    Abdomen:  no evidence of abdominal distention. No observe juandice.     Extremities: no cyanosis or clubbing noted.    Skin:  no xanthelasma, normal skin collar. No evidence of facial lacerations.      Neurologic: Normal arm motion bilateral, no tremors.  No evidence of focal defect.       Psychiatric: alert and oriented x3, calm                     Separate note                          Medical History  Surgical History Family History Social History   No past medical history on file. No past surgical history on file. No family history on file.   Social History     Socioeconomic History   ? Marital status:      Spouse name: Not on file   ? Number of children: Not on file   ? Years of education: Not on file   ? Highest education level: Not on file   Occupational History   ? Not on file   Social Needs   ? Financial resource strain: Not on file   ? Food insecurity     Worry: Not on file     Inability: Not on file   ? Transportation needs     Medical: Not on file     Non-medical: Not on file   Tobacco Use   ? Smoking status: Not on file   Substance and Sexual Activity   ? Alcohol use: Not on file   ? Drug use: Not on file   ? Sexual activity: Not on file   Lifestyle   ? Physical activity     Days per week: Not on file     Minutes per session: Not on file   ? Stress: Not on file   Relationships   ? Social connections     Talks on phone: Not on file     Gets together: Not on file     Attends [employer] service: Not on file     Active member of club or organization: Not on file     Attends meetings of clubs or organizations: Not on file     Relationship status: Not on file   ? Intimate partner violence     Fear of current or ex partner: Not on file     Emotionally abused: Not on file     Physically abused: Not on file     Forced sexual activity: Not on file   Other Topics Concern   ? Not on file   Social History Narrative   ? Not on file          Medications  Allergies   Current Outpatient Medications   Medication Sig Dispense Refill   ? ALPHA LIPOIC ACID ORAL Take 1,000 mg by mouth 2 (two) times a day.     ? ascorbic acid (VITAMIN C ORAL)  Take by mouth 3 (three) times a day.     ? BURDOCK ROOT ORAL Take by mouth daily.     ? cholecalciferol, vitamin D3, 250 mcg (10,000 unit) capsule Take 10,000 Units by mouth daily.     ? DULoxetine (CYMBALTA) 30 MG capsule Take 30 mg by mouth daily.     ? gabapentin (NEURONTIN) 300 MG capsule Take 300 mg by mouth at bedtime.     ? lisdexamfetamine (VYVANSE) 30 MG capsule Take 1 Cap by mouth daily.  Next appt due March-April 2020     ? medical cannabis (Patient's own supply) by Other route see administration instructions. (The purpose of this order is to document that the patient reports taking medical cannabis. This is not a prescription, and is not used to certify that the patient has a  qualifying medical condition.)     ? traZODone (DESYREL) 100 MG tablet Take 150-200 mg by mouth at bedtime.        No current facility-administered medications for this visit.     Allergies   Allergen Reactions   ? Cephalosporins Other (See Comments)     PN: Other reaction(s): Hives, Unknown Reaction     ? Penicillins Hives and Other (See Comments)     PN: Other reaction(s): Hives; HUT Reaction: Hives; HUT Noted: [id number]     ? Sulfa (Sulfonamide Antibiotics)          Lab Results    Chemistry/lipid CBC Cardiac Enzymes/BNP/TSH/INR   No results found for: CHOL, HDL, LDLCALC, TRIG, CREATININE, BUN, K, NA, CL, CO2 No results found for: WBC, HGB, HCT, MCV, PLT No results found for: CKTOTAL, CKMB, CKMBINDEX, TROPONINI, BNP, TSH, INR     Law Wilkins

## 2021-10-24 ENCOUNTER — HEALTH MAINTENANCE LETTER (OUTPATIENT)
Age: 31
End: 2021-10-24

## 2022-02-13 ENCOUNTER — HEALTH MAINTENANCE LETTER (OUTPATIENT)
Age: 32
End: 2022-02-13

## 2022-10-15 ENCOUNTER — HEALTH MAINTENANCE LETTER (OUTPATIENT)
Age: 32
End: 2022-10-15

## 2023-03-26 ENCOUNTER — HEALTH MAINTENANCE LETTER (OUTPATIENT)
Age: 33
End: 2023-03-26

## 2023-03-30 ENCOUNTER — HOSPITAL ENCOUNTER (EMERGENCY)
Facility: CLINIC | Age: 33
Discharge: HOME | End: 2023-03-31
Attending: EMERGENCY MEDICINE | Admitting: EMERGENCY MEDICINE
Payer: COMMERCIAL

## 2023-03-30 DIAGNOSIS — F10.220 ALCOHOL DEPENDENCE WITH UNCOMPLICATED INTOXICATION (H): ICD-10-CM

## 2023-03-30 LAB — ALCOHOL BREATH TEST: 0.28 (ref 0–0.01)

## 2023-03-30 PROCEDURE — C9803 HOPD COVID-19 SPEC COLLECT: HCPCS | Performed by: EMERGENCY MEDICINE

## 2023-03-30 PROCEDURE — U0005 INFEC AGEN DETEC AMPLI PROBE: HCPCS | Performed by: EMERGENCY MEDICINE

## 2023-03-30 PROCEDURE — 96360 HYDRATION IV INFUSION INIT: CPT | Performed by: EMERGENCY MEDICINE

## 2023-03-30 PROCEDURE — 96361 HYDRATE IV INFUSION ADD-ON: CPT | Performed by: EMERGENCY MEDICINE

## 2023-03-30 PROCEDURE — 99284 EMERGENCY DEPT VISIT MOD MDM: CPT | Mod: 25 | Performed by: EMERGENCY MEDICINE

## 2023-03-30 PROCEDURE — 99284 EMERGENCY DEPT VISIT MOD MDM: CPT | Performed by: EMERGENCY MEDICINE

## 2023-03-30 PROCEDURE — 82075 ASSAY OF BREATH ETHANOL: CPT | Performed by: EMERGENCY MEDICINE

## 2023-03-30 RX ORDER — METRONIDAZOLE 500 MG/1
TABLET ORAL
COMMUNITY
Start: 2023-03-09 | End: 2024-07-25

## 2023-03-30 ASSESSMENT — ACTIVITIES OF DAILY LIVING (ADL): ADLS_ACUITY_SCORE: 33

## 2023-03-31 ENCOUNTER — TELEPHONE (OUTPATIENT)
Dept: BEHAVIORAL HEALTH | Facility: CLINIC | Age: 33
End: 2023-03-31
Payer: COMMERCIAL

## 2023-03-31 ENCOUNTER — TELEPHONE (OUTPATIENT)
Dept: BEHAVIORAL HEALTH | Facility: CLINIC | Age: 33
End: 2023-03-31

## 2023-03-31 VITALS
HEART RATE: 98 BPM | DIASTOLIC BLOOD PRESSURE: 75 MMHG | SYSTOLIC BLOOD PRESSURE: 118 MMHG | OXYGEN SATURATION: 96 % | RESPIRATION RATE: 16 BRPM | TEMPERATURE: 98.1 F

## 2023-03-31 LAB
ALBUMIN SERPL BCG-MCNC: 5.1 G/DL (ref 3.5–5.2)
ALBUMIN UR-MCNC: 10 MG/DL
ALP SERPL-CCNC: 48 U/L (ref 35–104)
ALT SERPL W P-5'-P-CCNC: 40 U/L (ref 10–35)
AMPHETAMINES UR QL SCN: ABNORMAL
ANION GAP SERPL CALCULATED.3IONS-SCNC: 20 MMOL/L (ref 7–15)
APPEARANCE UR: CLEAR
AST SERPL W P-5'-P-CCNC: 42 U/L (ref 10–35)
BACTERIA #/AREA URNS HPF: ABNORMAL /HPF
BARBITURATES UR QL SCN: ABNORMAL
BASOPHILS # BLD AUTO: 0 10E3/UL (ref 0–0.2)
BASOPHILS NFR BLD AUTO: 1 %
BENZODIAZ UR QL SCN: ABNORMAL
BILIRUB SERPL-MCNC: 0.4 MG/DL
BILIRUB UR QL STRIP: NEGATIVE
BUN SERPL-MCNC: 7.7 MG/DL (ref 6–20)
BZE UR QL SCN: ABNORMAL
CALCIUM SERPL-MCNC: 9.7 MG/DL (ref 8.6–10)
CANNABINOIDS UR QL SCN: ABNORMAL
CHLORIDE SERPL-SCNC: 99 MMOL/L (ref 98–107)
COLOR UR AUTO: ABNORMAL
CREAT SERPL-MCNC: 0.64 MG/DL (ref 0.51–0.95)
DEPRECATED HCO3 PLAS-SCNC: 20 MMOL/L (ref 22–29)
EOSINOPHIL # BLD AUTO: 0 10E3/UL (ref 0–0.7)
EOSINOPHIL NFR BLD AUTO: 0 %
ERYTHROCYTE [DISTWIDTH] IN BLOOD BY AUTOMATED COUNT: 13.2 % (ref 10–15)
GFR SERPL CREATININE-BSD FRML MDRD: >90 ML/MIN/1.73M2
GLUCOSE SERPL-MCNC: 153 MG/DL (ref 70–99)
GLUCOSE UR STRIP-MCNC: NEGATIVE MG/DL
HCG UR QL: NEGATIVE
HCT VFR BLD AUTO: 36 % (ref 35–47)
HGB BLD-MCNC: 12.4 G/DL (ref 11.7–15.7)
HGB UR QL STRIP: NEGATIVE
IMM GRANULOCYTES # BLD: 0 10E3/UL
IMM GRANULOCYTES NFR BLD: 0 %
KETONES UR STRIP-MCNC: 10 MG/DL
LEUKOCYTE ESTERASE UR QL STRIP: ABNORMAL
LYMPHOCYTES # BLD AUTO: 2.9 10E3/UL (ref 0.8–5.3)
LYMPHOCYTES NFR BLD AUTO: 35 %
MAGNESIUM SERPL-MCNC: 1.9 MG/DL (ref 1.7–2.3)
MCH RBC QN AUTO: 28.8 PG (ref 26.5–33)
MCHC RBC AUTO-ENTMCNC: 34.4 G/DL (ref 31.5–36.5)
MCV RBC AUTO: 84 FL (ref 78–100)
MONOCYTES # BLD AUTO: 0.7 10E3/UL (ref 0–1.3)
MONOCYTES NFR BLD AUTO: 8 %
MUCOUS THREADS #/AREA URNS LPF: PRESENT /LPF
NEUTROPHILS # BLD AUTO: 4.7 10E3/UL (ref 1.6–8.3)
NEUTROPHILS NFR BLD AUTO: 56 %
NITRATE UR QL: NEGATIVE
NRBC # BLD AUTO: 0 10E3/UL
NRBC BLD AUTO-RTO: 0 /100
OPIATES UR QL SCN: ABNORMAL
PH UR STRIP: 5.5 [PH] (ref 5–7)
PLATELET # BLD AUTO: 389 10E3/UL (ref 150–450)
POTASSIUM SERPL-SCNC: 3.5 MMOL/L (ref 3.4–5.3)
PROT SERPL-MCNC: 7.7 G/DL (ref 6.4–8.3)
RBC # BLD AUTO: 4.3 10E6/UL (ref 3.8–5.2)
RBC URINE: 0 /HPF
RENAL TUB EPI: <1 /HPF
SARS-COV-2 RNA RESP QL NAA+PROBE: NEGATIVE
SODIUM SERPL-SCNC: 139 MMOL/L (ref 136–145)
SP GR UR STRIP: 1.01 (ref 1–1.03)
SQUAMOUS EPITHELIAL: 8 /HPF
UROBILINOGEN UR STRIP-MCNC: NORMAL MG/DL
WBC # BLD AUTO: 8.5 10E3/UL (ref 4–11)
WBC URINE: 19 /HPF

## 2023-03-31 PROCEDURE — 80053 COMPREHEN METABOLIC PANEL: CPT | Performed by: EMERGENCY MEDICINE

## 2023-03-31 PROCEDURE — 258N000003 HC RX IP 258 OP 636: Performed by: EMERGENCY MEDICINE

## 2023-03-31 PROCEDURE — 36415 COLL VENOUS BLD VENIPUNCTURE: CPT | Performed by: EMERGENCY MEDICINE

## 2023-03-31 PROCEDURE — 85025 COMPLETE CBC W/AUTO DIFF WBC: CPT | Performed by: EMERGENCY MEDICINE

## 2023-03-31 PROCEDURE — 83735 ASSAY OF MAGNESIUM: CPT | Performed by: EMERGENCY MEDICINE

## 2023-03-31 PROCEDURE — 81025 URINE PREGNANCY TEST: CPT | Performed by: EMERGENCY MEDICINE

## 2023-03-31 PROCEDURE — 81001 URINALYSIS AUTO W/SCOPE: CPT | Performed by: EMERGENCY MEDICINE

## 2023-03-31 PROCEDURE — 80307 DRUG TEST PRSMV CHEM ANLYZR: CPT | Performed by: EMERGENCY MEDICINE

## 2023-03-31 PROCEDURE — 87086 URINE CULTURE/COLONY COUNT: CPT | Performed by: EMERGENCY MEDICINE

## 2023-03-31 RX ORDER — MULTIPLE VITAMINS W/ MINERALS TAB 9MG-400MCG
1 TAB ORAL DAILY
Status: DISCONTINUED | OUTPATIENT
Start: 2023-03-31 | End: 2023-03-31 | Stop reason: HOSPADM

## 2023-03-31 RX ORDER — DIAZEPAM 5 MG
5-20 TABLET ORAL EVERY 30 MIN PRN
Status: DISCONTINUED | OUTPATIENT
Start: 2023-03-31 | End: 2023-03-31 | Stop reason: HOSPADM

## 2023-03-31 RX ORDER — NITROFURANTOIN 25; 75 MG/1; MG/1
100 CAPSULE ORAL
COMMUNITY
Start: 2023-03-28 | End: 2023-04-04

## 2023-03-31 RX ORDER — FOLIC ACID 1 MG/1
1 TABLET ORAL DAILY
Status: DISCONTINUED | OUTPATIENT
Start: 2023-03-31 | End: 2023-03-31 | Stop reason: HOSPADM

## 2023-03-31 RX ADMIN — SODIUM CHLORIDE 1000 ML: 9 INJECTION, SOLUTION INTRAVENOUS at 00:17

## 2023-03-31 ASSESSMENT — ACTIVITIES OF DAILY LIVING (ADL)
ADLS_ACUITY_SCORE: 35

## 2023-03-31 NOTE — ED PROVIDER NOTES
[facility] EMERGENCY DEPARTMENT ([facility])     March 30, 2023  ED Provider Note  [facility]      History     Chief Complaint   Patient presents with     Addiction Problem     Patient states she is here for alcohol detox, states to drink a 12 pack of white claw a day -states to have a seizure history, states last one was a year an a half ago. Patient states her last drink was prior to coming in. Patient also has a known UTI.      EDU Campbell is a 33 year old female with a past medical history significant for polysubstance abuse, anxiety, depression  who presents to the Emergency Department seeking detox from alcohol.  Patient states that she has been drinking alcohol daily for the past 7 days.  She states that she drinks a 12 pack of white claws per day.  She becomes tremulous if she does not drink.  She reports a history of withdrawal seizure several years ago.  Patient denies history of DTs.  She drink 30 minutes before coming to the emergency department.  Patient denies any depression or suicidal ideation.  She denies any recent fall or injury.  She states she does currently have a urinary tract infection and has been on Macrobid for the past 2 days.  Care everywhere was reviewed.  She underwent a video visit 2 days ago in the [facility] and was empirically prescribed Macrobid for dysuria and frequency.  Patient denies any vaginal bleeding or discharge.       Past Medical History  Past Medical History:   Diagnosis Date     Acne      Alcohol related seizure (H)      Depressive disorder      Eating disorder, unspecified      Other and unspecified alcohol dependence, unspecified drinking behavior 10/15/2013     Past Surgical History:   Procedure Laterality Date     NO HISTORY OF SURGERY       etonogestrel (IMPLANON) 68 MG IMPL  metroNIDAZOLE (FLAGYL) 500 MG tablet  traZODone (DESYREL) 100 MG tablet  chlordiazePOXIDE (LIBRIUM) 25 MG  capsule  dextroamphetamine (DEXTROSTAT) 5 MG tablet  DULoxetine (CYMBALTA) 20 MG capsule  GABAPENTIN PO  ibuprofen (ADVIL,MOTRIN) 200 MG tablet  Lisdexamfetamine Dimesylate (VYVANSE PO)      Allergies   Allergen Reactions     Augmentin      Cephalosporins      Penicillins      Sulfa Drugs      Family History  Family History   Problem Relation Age of Onset     Blood Disease Paternal Grandmother      Social History   Social History     Tobacco Use     Smoking status: Former     Types: Vaping Device     Smokeless tobacco: Never   Substance Use Topics     Alcohol use: Yes     Comment: 1/2 liter vodka daily for past 5 days     Drug use: Yes     Types: Marijuana     Comment: medical marijuana         A medically appropriate review of systems was performed with pertinent positives and negatives noted in the HPI, and all other systems negative.    Physical Exam   BP: 112/76  Pulse: 96  Temp: 98.7  F (37.1  C)  Resp: 16  SpO2: 98 %  Physical Exam  Vitals and nursing note reviewed.   Constitutional:       General: She is not in acute distress.     Appearance: Normal appearance. She is not diaphoretic.   HENT:      Head: Normocephalic and atraumatic.      Mouth/Throat:      Pharynx: No oropharyngeal exudate.   Eyes:      General: No scleral icterus.     Pupils: Pupils are equal, round, and reactive to light.   Cardiovascular:      Rate and Rhythm: Normal rate and regular rhythm.      Pulses: Normal pulses.      Heart sounds: Normal heart sounds.   Pulmonary:      Effort: Pulmonary effort is normal. No respiratory distress.      Breath sounds: Normal breath sounds.   Abdominal:      General: Bowel sounds are normal.      Palpations: Abdomen is soft.      Tenderness: There is no abdominal tenderness.   Musculoskeletal:         General: No tenderness. Normal range of motion.   Skin:     General: Skin is warm and dry.      Capillary Refill: Capillary refill takes less than 2 seconds.      Findings: No rash.   Neurological:       General: No focal deficit present.      Mental Status: She is alert.      Motor: No weakness.      Coordination: Coordination normal.      Gait: Gait normal.   Psychiatric:         Mood and Affect: Mood normal.           ED Course, Procedures, & Data      Procedures                      Results for orders placed or performed during the hospital encounter of 03/30/23   Asymptomatic COVID-19 Virus (Coronavirus) by PCR Nose     Status: Normal    Specimen: Nose; Swab   Result Value Ref Range    SARS CoV2 PCR Negative Negative    Narrative    Testing was performed using the Xpert Xpress SARS-CoV-2 Assay on the Cepheid Gene-Xpert Instrument Systems. Additional information about this Emergency Use Authorization (EUA) assay can be found via the Lab Guide. This test should be ordered for the detection of SARS-CoV-2 in individuals who meet SARS-CoV-2 clinical and/or epidemiological criteria as well as from individuals without symptoms or other reasons to suspect COVID-19. Test performance for asymptomatic patients has only been established in anterior nasal swab specimens. This test is for in vitro diagnostic use under the FDA EUA for laboratories certified under CLIA to perform high complexity testing. This test has not been FDA cleared or approved. A negative result does not rule out the presence of PCR inhibitors in the specimen or target RNA concentration below the limit of detection for the assay. The possibility of a false negative should be considered if the patient's recent exposure or clinical presentation suggests COVID-19. This test was validated by the [facility]. This laboratory is certified under the Clinical Laboratory Improvement Amendments (CLIA) as qualified to perform high complexity laboratory testing.     Comprehensive metabolic panel     Status: Abnormal   Result Value Ref Range    Sodium 139 136 - 145 mmol/L    Potassium 3.5 3.4 - 5.3 mmol/L    Chloride 99 98 - 107  mmol/L    Carbon Dioxide (CO2) 20 (L) 22 - 29 mmol/L    Anion Gap 20 (H) 7 - 15 mmol/L    Urea Nitrogen 7.7 6.0 - 20.0 mg/dL    Creatinine 0.64 0.51 - 0.95 mg/dL    Calcium 9.7 8.6 - 10.0 mg/dL    Glucose 153 (H) 70 - 99 mg/dL    Alkaline Phosphatase 48 35 - 104 U/L    AST 42 (H) 10 - 35 U/L    ALT 40 (H) 10 - 35 U/L    Protein Total 7.7 6.4 - 8.3 g/dL    Albumin 5.1 3.5 - 5.2 g/dL    Bilirubin Total 0.4 <=1.2 mg/dL    GFR Estimate >90 >60 mL/min/1.73m2   Magnesium     Status: Normal   Result Value Ref Range    Magnesium 1.9 1.7 - 2.3 mg/dL   CBC with platelets and differential     Status: None   Result Value Ref Range    WBC Count 8.5 4.0 - 11.0 10e3/uL    RBC Count 4.30 3.80 - 5.20 10e6/uL    Hemoglobin 12.4 11.7 - 15.7 g/dL    Hematocrit 36.0 35.0 - 47.0 %    MCV 84 78 - 100 fL    MCH 28.8 26.5 - 33.0 pg    MCHC 34.4 31.5 - 36.5 g/dL    RDW 13.2 10.0 - 15.0 %    Platelet Count 389 150 - 450 10e3/uL    % Neutrophils 56 %    % Lymphocytes 35 %    % Monocytes 8 %    % Eosinophils 0 %    % Basophils 1 %    % Immature Granulocytes 0 %    NRBCs per 100 WBC 0 <1 /100    Absolute Neutrophils 4.7 1.6 - 8.3 10e3/uL    Absolute Lymphocytes 2.9 0.8 - 5.3 10e3/uL    Absolute Monocytes 0.7 0.0 - 1.3 10e3/uL    Absolute Eosinophils 0.0 0.0 - 0.7 10e3/uL    Absolute Basophils 0.0 0.0 - 0.2 10e3/uL    Absolute Immature Granulocytes 0.0 <=0.4 10e3/uL    Absolute NRBCs 0.0 10e3/uL   Alcohol breath test POCT     Status: Abnormal   Result Value Ref Range    Alcohol Breath Test 0.276 (A) 0.00 - 0.01   Urine Drugs of Abuse Screen     Status: None (In process)    Narrative    The following orders were created for panel order Urine Drugs of Abuse Screen.  Procedure                               Abnormality         Status                     ---------                               -----------         ------                     Drug abuse screen 1 urin...[[id number]]                      In process                   Please view results for  these tests on the individual orders.   CBC with platelets differential     Status: None    Narrative    The following orders were created for panel order CBC with platelets differential.  Procedure                               Abnormality         Status                     ---------                               -----------         ------                     CBC with platelets and d...[[id number]]                      Final result                 Please view results for these tests on the individual orders.     Medications   0.9% sodium chloride BOLUS (1,000 mLs Intravenous $New Bag 3/31/23 0017)     Labs Ordered and Resulted from Time of ED Arrival to Time of ED Departure   COMPREHENSIVE METABOLIC PANEL - Abnormal       Result Value    Sodium 139      Potassium 3.5      Chloride 99      Carbon Dioxide (CO2) 20 (*)     Anion Gap 20 (*)     Urea Nitrogen 7.7      Creatinine 0.64      Calcium 9.7      Glucose 153 (*)     Alkaline Phosphatase 48      AST 42 (*)     ALT 40 (*)     Protein Total 7.7      Albumin 5.1      Bilirubin Total 0.4      GFR Estimate >90     ALCOHOL BREATH TEST POCT - Abnormal    Alcohol Breath Test 0.276 (*)    COVID-19 VIRUS (CORONAVIRUS) BY PCR - Normal    SARS CoV2 PCR Negative     MAGNESIUM - Normal    Magnesium 1.9     CBC WITH PLATELETS AND DIFFERENTIAL    WBC Count 8.5      RBC Count 4.30      Hemoglobin 12.4      Hematocrit 36.0      MCV 84      MCH 28.8      MCHC 34.4      RDW 13.2      Platelet Count 389      % Neutrophils 56      % Lymphocytes 35      % Monocytes 8      % Eosinophils 0      % Basophils 1      % Immature Granulocytes 0      NRBCs per 100 WBC 0      Absolute Neutrophils 4.7      Absolute Lymphocytes 2.9      Absolute Monocytes 0.7      Absolute Eosinophils 0.0      Absolute Basophils 0.0      Absolute Immature Granulocytes 0.0      Absolute NRBCs 0.0     HCG QUALITATIVE URINE   ROUTINE UA WITH MICROSCOPIC REFLEX TO CULTURE   DRUG ABUSE SCREEN 1 URINE (ED)     No orders  to display          Critical care was not performed.     Medical Decision Making  The patient's presentation was of moderate complexity (a chronic illness mild to moderate exacerbation, progression, or side effect of treatment).    The patient's evaluation involved:  review of external note(s) from 2 sources (see separate area of note for details)  ordering and/or review of 3+ test(s) in this encounter (see separate area of note for details)    The patient's management necessitated high risk (a decision regarding hospitalization).      Assessment & Plan    33 year old female with history of alcohol abuse and dependence to the emergency currency detox.  She did drink alcohol daily for a week and gets withdrawal symptoms if she does not drink.  She reports a remote history of alcohol withdrawal seizure.  She arrived to the [facility] part with an elevated alcohol level.  She reports a UTI for the past 2 to 3 days.  She has been on Macrobid for 2 days after a virtual visit.  No urinalysis was performed at that time so 1 was ordered today.  Patient does not have any other medical concerns.  She denies any depression or suicidal ideation.  Her labs remarkable for mild transaminitis but otherwise unremarkable.  She appears medically stable for detox admission.    I have reviewed the nursing notes. I have reviewed the findings, diagnosis, plan and need for follow up with the patient.    New Prescriptions    No medications on file       Final diagnoses:   Alcohol dependence with uncomplicated intoxication (H)       Ketan Peoples MD  [facility] EMERGENCY DEPARTMENT  3/30/2023     Ketan Peoples MD  03/31/23 0059

## 2023-03-31 NOTE — ED NOTES
Patient was notably upset when being brought back from triage, stated that a man (patient in room 12) is an ex boyfriend. She appears very frightened and panicked about him being near her. Security was made aware as well as staff. Staff near room 12 is aware that patient needs to use the bathroom near him and not go down her area of the hallway. She is aware that there is security and that she is in a safe environment.

## 2023-03-31 NOTE — ED NOTES
Emergency Department Patient Sign-out       Brief HPI and ED course:  Patient is a 33 year old female signed out to me by Dr. Peoples.  See initial ED Provider note for details of the presentation. In brief, patient with alcohol use disorder and acute intoxication.  Patient seeking detox.  Patient awaiting detox bed readiness.    Vitals:   Patient Vitals for the past 24 hrs:   BP Temp Temp src Pulse Resp SpO2   03/31/23 0307 -- 98.1  F (36.7  C) Oral -- -- 96 %   03/31/23 0306 118/75 -- -- 98 -- --   03/30/23 2253 -- 98.7  F (37.1  C) -- -- -- --   03/30/23 2251 112/76 -- Oral 96 16 98 %       Received Sign-out Plan:    Pending studies include: None    Plan:   -Monitor with MSSA protocol until detox bed is ready        Events after assuming care:  ED nursing was contacted by camera monitoring personnel that the patient had reportedly inserted a vape vaginally.  Patient desired to have it on the detox unit, which is not allowed there.  Patient was able to self extricate the item.  Patient then reported that she no longer wished to be admitted to detox, desired discharge home.  Patient clinically sober with decision-making capacity.  Patient not showing signs of significant active withdrawal currently.  Patient discharged home, provided with substance/alcohol cessation resources.  Recommend follow-up with primary care for further cessation counseling and assistance.  Recommend return to ED if any urgent health concerns.     --  Manoj Cruz MD   Emergency Medicine       Manoj Cruz MD  03/31/23 7056

## 2023-03-31 NOTE — TELEPHONE ENCOUNTER
S: [facility] , Provider Richelle calling at 12:55AM with clinical on a 33 year old/Female presenting for alcohol detox.     B: Pt presents for ETOH detox.   Currently reports drinking a 12 pack of white claws, daily, for 7 days.    Patient reports last use was prior to arrival.  Pt VIKAS: 276  Pt  denies hx of DT  Pt  endorses hx of seizures. Last seizure: several years ago  Pt endorsing the following symptoms of withdrawal: Tremulous  MSSA Score: 5    Pt denies acute mental health or medical concerns.   Pt endorses other drug use: Cannabis Amount/frequency: frequent    Does Pt have a detox care plan in [facility]? No  Does pt present with specific needs, assistive devices, or exclusionary criteria? None  Is the patient ambulating, eating and drinking in the ED? Yes    A: Pt meets criteria to be presented for IP detox admission. Patient is voluntary  COVID: Negative  Utox: Negative  CMP: WNL  CBC: WNL  HCG: Negative     R: Patient cleared and ready for behavioral bed placement: Yes    Presenting for admission to /    1:09AM Intake called [facility].     1:17AM Intake received a call from Dr. Tejas France. This pt has been accepted to [facility].     1:26AM intake called [facility] and provided disposition to charge nurse. Nurse report: Charge will call.     1:39AM Intake called [facility] an provided placement information to [facility].

## 2023-03-31 NOTE — ED TRIAGE NOTES
Patient is here for detox, states to have had her last drink before coming in, and does have a seizure history.      Triage Assessment     Row Name 03/30/23 9562       Triage Assessment (Adult)    Airway WDL WDL       Respiratory WDL    Respiratory WDL WDL       Skin Circulation/Temperature WDL    Skin Circulation/Temperature WDL WDL       Cardiac WDL    Cardiac WDL WDL       Peripheral/Neurovascular WDL    Peripheral Neurovascular WDL WDL       Cognitive/Neuro/Behavioral WDL    Cognitive/Neuro/Behavioral WDL WDL

## 2023-03-31 NOTE — TELEPHONE ENCOUNTER
0505 MD called to say pt no longer wants detox.  Detox list has been updated.      [id number] 3A has been notified.

## 2023-03-31 NOTE — DISCHARGE INSTRUCTIONS
Please use the below resources and your primary care physician to safely cease alcohol and/or substance use.     Return to the ED if you are having any urgent/life-threatening concerns.     DISCHARGE RESOURCES:  -[facility]: [phone] (detox), [phone] (outpatient & [facility])  -[facility] ([address]): [phone]  -[facility]: [phone]  -[facility]: [phone]  -[facility] Recovery - self management for addiction recovery:  www.smartrecovery.org    -[facility]: 516.208.9842.  -[facility] [phone]   -Substance Abuse and Mental Health Services (www.samhsa.gov)  -Harm Reduction Coalition (www. Harmreduction.org)  -[place] Opioid Prevention Coalition: www.opioidcoalition.org  -Poison control 1-656.255.1734       Sober Support Group Information:  AA/NA & Sponsor/Support  -Alcoholics Anonymous (www.alcoholics-anonymous.org): for local information 24 hours/day  -AA Intergroup service office in [place] (http://www.aastpaul.org/) 612.607.4945  -AA Intergroup service office in [facility]: [phone]. (http://www.[place].org/)  -Narcotics Anonymous (www.nam[place].org) (973) 173-1507   -Sober Fun Activities: www.sober-activities.[employer]/[facility]//[facility] Recovery Connection ([facility])  [facility] connects people seeking recovery to resources that help foster and sustain long-term recovery.  Whether you are seeking resources for treatment, transportation, housing, job training, education, health care or other pathways to recovery, [facility] is a great place to start.   Phone: [phone]. www.[place] (Great listing of all types of recovery and non-recovery related resources)

## 2023-04-01 LAB — BACTERIA UR CULT: NORMAL

## 2024-05-26 ENCOUNTER — HEALTH MAINTENANCE LETTER (OUTPATIENT)
Age: 34
End: 2024-05-26

## 2024-06-25 ENCOUNTER — MEDICAL CORRESPONDENCE (OUTPATIENT)
Dept: HEALTH INFORMATION MANAGEMENT | Facility: CLINIC | Age: 34
End: 2024-06-25
Payer: COMMERCIAL

## 2024-06-27 ENCOUNTER — TRANSCRIBE ORDERS (OUTPATIENT)
Dept: OTHER | Age: 34
End: 2024-06-27

## 2024-06-27 DIAGNOSIS — M35.3 POLYMYALGIA RHEUMATICA (H): Primary | ICD-10-CM

## 2024-07-25 ENCOUNTER — OFFICE VISIT (OUTPATIENT)
Dept: RHEUMATOLOGY | Facility: CLINIC | Age: 34
End: 2024-07-25
Payer: COMMERCIAL

## 2024-07-25 VITALS
WEIGHT: 145.4 LBS | DIASTOLIC BLOOD PRESSURE: 63 MMHG | RESPIRATION RATE: 12 BRPM | SYSTOLIC BLOOD PRESSURE: 94 MMHG | BODY MASS INDEX: 21.47 KG/M2 | OXYGEN SATURATION: 98 % | HEART RATE: 77 BPM

## 2024-07-25 DIAGNOSIS — K59.09 CHRONIC CONSTIPATION: Primary | ICD-10-CM

## 2024-07-25 PROCEDURE — 99205 OFFICE O/P NEW HI 60 MIN: CPT | Performed by: INTERNAL MEDICINE

## 2024-07-25 ASSESSMENT — PAIN SCALES - GENERAL: PAINLEVEL: SEVERE PAIN (7)

## 2024-07-25 NOTE — NURSING NOTE
Chief Complaint   Patient presents with    New Patient     Polymyalgia rheumatica       Vitals:    07/25/24 1333   BP: 94/63   BP Location: Left arm   Patient Position: Sitting   Cuff Size: Adult Regular   Pulse: 77   Resp: 12   SpO2: 98%   Weight: 66 kg (145 lb 6.4 oz)       Body mass index is 21.47 kg/m .      Grzegorz Duarte MA

## 2024-07-25 NOTE — PROGRESS NOTES
New patient evaluation for myalgia.    Leigha is 34 years old and she has muscle and joint pain that seems to be intermittent and chronic in nature.  For example she has pain in the inner aspect of her right thigh.  At the end of the day she seems to have more difficulty with her right arm.  Sometimes it affects her driving that she needs to pull over for after only 10 minutes of driving.  She has seen a rheumatologist previously and they did not have a diagnosis while blood work was all normal.    Second issue is that she has hypermobility and she has hyperextension of her elbows and knees, the ability of bending her thumbs back all the way to her forearm.  She also seems to have some hypertension of both pinkies and she is able to put her hands flat on the floor with straight legs.  This she has more than 5 Beighton criteria for hypermobility/benign EDS.    She suffers from severe chronic constipation despite stool softeners, laxatives, MiraLAX up to 4 packs a day.  She feels bloated all the time, even after light breakfast.    She is doing a daily workout to stay fit.    Past medical history  Fibromyalgia  EDS/benign hypermobility syndrome.  Chronic constipation  ADHD    Currently the only medication that she takes is trazodone 200 mg at bedtime    Physical examination  I reviewed her vital signs and they are normal.  Her weight is 145 pounds.  Joint examination shows that there is no active synovitis in the upper or lower extremities.  The muscles in the arm seem to be symmetric and there is no sign of atrophy.  She has normal sensation.  There is no skin rash.    I reviewed her recent blood test results that were normal including rheumatoid factor CCP antibody, OPAL, inflammatory markers chemistries and CBC.  X-rays of the knees were normal    Impression/plan  1.  I do not see any clear diagnosis to explain her symptoms,  2.  She does seem to have hypermobility/EDS.  3.  Because of the chronic constipation I  submitted a consultation request with gastroenterology.  4. I did not schedule follow-up visit but she is welcome to return for further evaluation if new symptoms were to develop.    60 minutes spent on the date of the encounter doing chart review, history and exam, documentation and further activities per the note.

## 2024-07-26 ENCOUNTER — TELEPHONE (OUTPATIENT)
Dept: GASTROENTEROLOGY | Facility: CLINIC | Age: 34
End: 2024-07-26
Payer: COMMERCIAL

## 2024-07-26 NOTE — TELEPHONE ENCOUNTER
Pediatric GI referral received for a 34 year old patient. Routing to adult clinic pool for scheduling.    Noelle Reynolds on 7/26/2024 at 11:49 AM

## 2025-06-14 ENCOUNTER — HEALTH MAINTENANCE LETTER (OUTPATIENT)
Age: 35
End: 2025-06-14